# Patient Record
Sex: FEMALE | Race: ASIAN | NOT HISPANIC OR LATINO
[De-identification: names, ages, dates, MRNs, and addresses within clinical notes are randomized per-mention and may not be internally consistent; named-entity substitution may affect disease eponyms.]

---

## 2019-05-07 ENCOUNTER — APPOINTMENT (OUTPATIENT)
Dept: CARDIOLOGY | Facility: CLINIC | Age: 59
End: 2019-05-07
Payer: MEDICAID

## 2019-05-07 ENCOUNTER — NON-APPOINTMENT (OUTPATIENT)
Age: 59
End: 2019-05-07

## 2019-05-07 VITALS
RESPIRATION RATE: 17 BRPM | SYSTOLIC BLOOD PRESSURE: 168 MMHG | WEIGHT: 165 LBS | BODY MASS INDEX: 28.87 KG/M2 | OXYGEN SATURATION: 97 % | HEART RATE: 53 BPM | HEIGHT: 63.5 IN | DIASTOLIC BLOOD PRESSURE: 79 MMHG | TEMPERATURE: 98.3 F

## 2019-05-07 PROCEDURE — 93000 ELECTROCARDIOGRAM COMPLETE: CPT | Mod: 59

## 2019-05-07 PROCEDURE — 93015 CV STRESS TEST SUPVJ I&R: CPT

## 2019-05-07 PROCEDURE — 99214 OFFICE O/P EST MOD 30 MIN: CPT | Mod: 25

## 2019-05-07 PROCEDURE — 93306 TTE W/DOPPLER COMPLETE: CPT

## 2021-10-12 ENCOUNTER — NON-APPOINTMENT (OUTPATIENT)
Age: 61
End: 2021-10-12

## 2021-10-12 ENCOUNTER — APPOINTMENT (OUTPATIENT)
Dept: CARDIOLOGY | Facility: CLINIC | Age: 61
End: 2021-10-12
Payer: MEDICAID

## 2021-10-12 VITALS
TEMPERATURE: 97.2 F | BODY MASS INDEX: 29.29 KG/M2 | DIASTOLIC BLOOD PRESSURE: 77 MMHG | SYSTOLIC BLOOD PRESSURE: 171 MMHG | HEART RATE: 53 BPM | OXYGEN SATURATION: 97 % | RESPIRATION RATE: 18 BRPM | WEIGHT: 168 LBS

## 2021-10-12 DIAGNOSIS — R07.89 OTHER CHEST PAIN: ICD-10-CM

## 2021-10-12 PROCEDURE — 99072 ADDL SUPL MATRL&STAF TM PHE: CPT

## 2021-10-12 PROCEDURE — 99214 OFFICE O/P EST MOD 30 MIN: CPT | Mod: 25

## 2021-10-12 PROCEDURE — 93000 ELECTROCARDIOGRAM COMPLETE: CPT | Mod: 59

## 2021-10-12 PROCEDURE — ZZZZZ: CPT

## 2021-10-12 PROCEDURE — 93306 TTE W/DOPPLER COMPLETE: CPT

## 2021-10-12 PROCEDURE — 93015 CV STRESS TEST SUPVJ I&R: CPT

## 2021-10-12 NOTE — PHYSICAL EXAM
[General Appearance - Well Developed] : well developed [Normal Appearance] : normal appearance [Well Groomed] : well groomed [General Appearance - Well Nourished] : well nourished [No Deformities] : no deformities [General Appearance - In No Acute Distress] : no acute distress [Normal Conjunctiva] : the conjunctiva exhibited no abnormalities [Eyelids - No Xanthelasma] : the eyelids demonstrated no xanthelasmas [Normal Oral Mucosa] : normal oral mucosa [No Oral Pallor] : no oral pallor [No Oral Cyanosis] : no oral cyanosis [Normal Jugular Venous A Waves Present] : normal jugular venous A waves present [Normal Jugular Venous V Waves Present] : normal jugular venous V waves present [No Jugular Venous Dumas A Waves] : no jugular venous dumas A waves [Respiration, Rhythm And Depth] : normal respiratory rhythm and effort [Exaggerated Use Of Accessory Muscles For Inspiration] : no accessory muscle use [Auscultation Breath Sounds / Voice Sounds] : lungs were clear to auscultation bilaterally [Heart Rate And Rhythm] : heart rate and rhythm were normal [Heart Sounds] : normal S1 and S2 [Murmurs] : no murmurs present [Arterial Pulses Normal] : the arterial pulses were normal [Edema] : no peripheral edema present [Abdomen Soft] : soft [Abdomen Tenderness] : non-tender [Abdomen Mass (___ Cm)] : no abdominal mass palpated [Abnormal Walk] : normal gait [Gait - Sufficient For Exercise Testing] : the gait was sufficient for exercise testing [Nail Clubbing] : no clubbing of the fingernails [Cyanosis, Localized] : no localized cyanosis [Petechial Hemorrhages (___cm)] : no petechial hemorrhages [] : no ischemic changes [Oriented To Time, Place, And Person] : oriented to person, place, and time [Affect] : the affect was normal [Mood] : the mood was normal [No Anxiety] : not feeling anxious

## 2021-12-29 ENCOUNTER — APPOINTMENT (OUTPATIENT)
Dept: CARDIOLOGY | Facility: CLINIC | Age: 61
End: 2021-12-29
Payer: MEDICAID

## 2021-12-29 ENCOUNTER — NON-APPOINTMENT (OUTPATIENT)
Age: 61
End: 2021-12-29

## 2021-12-29 VITALS
OXYGEN SATURATION: 97 % | WEIGHT: 169 LBS | SYSTOLIC BLOOD PRESSURE: 166 MMHG | TEMPERATURE: 96 F | HEART RATE: 69 BPM | BODY MASS INDEX: 29.47 KG/M2 | RESPIRATION RATE: 18 BRPM | DIASTOLIC BLOOD PRESSURE: 91 MMHG

## 2021-12-29 VITALS — DIASTOLIC BLOOD PRESSURE: 90 MMHG | SYSTOLIC BLOOD PRESSURE: 163 MMHG

## 2021-12-29 DIAGNOSIS — K29.70 GASTRITIS, UNSPECIFIED, W/OUT BLEEDING: ICD-10-CM

## 2021-12-29 DIAGNOSIS — R07.9 CHEST PAIN, UNSPECIFIED: ICD-10-CM

## 2021-12-29 PROCEDURE — 99072 ADDL SUPL MATRL&STAF TM PHE: CPT

## 2021-12-29 PROCEDURE — 93000 ELECTROCARDIOGRAM COMPLETE: CPT | Mod: 59

## 2021-12-29 PROCEDURE — 99214 OFFICE O/P EST MOD 30 MIN: CPT

## 2021-12-29 NOTE — REASON FOR VISIT
[Symptom and Test Evaluation] : symptom and test evaluation [FreeTextEntry1] : 61-year-old female with HTN and DM presents for followup.  \par \par Patient was last seen on 5/7/19.\par \par She is on Valsartan 160 mg for HTN.  She is on a statin.  Her DM is diet-controlled.\par \par Patient underwent an echocardiogram 5/7/19 and it showed normal LV function without significant valvular pathology. \par \par Patient underwent a treadmill stress test 5/7/19  and completed 4 minutes of Dany protocol.  There were upsloping ST depressions on ECG but no symptoms.  Following treadmill stress, there was no echocardiographic evidence of ischemia. \par \par

## 2021-12-29 NOTE — DISCUSSION/SUMMARY
[Hypertension] : hypertension [Stable] : stable [Medication Changes Per Orders] : Medication changes are as documented in orders [Exercise Regimen] : an exercise regimen [Dietary Modification] : dietary modification [Weight Loss] : weight loss [Acetaminophen Avoidance] : acetaminophen  avoidance [Low Sodium Diet] : low sodium diet [NSAIDs Avoidance] : non-steroidal anti-inflammatory drugs avoidance [Patient] : the patient [de-identified] : not meet the JNC7 criteria. [de-identified] : adding  carvedilol [FreeTextEntry1] : The patient has evidence of vaccination  effect for muscle aching, gastritis and possible increase of  heart rate. She was assured and give anti acid regimen along with further control of hypertension with small beta-blocker.\par She is worried of carditis. The clinical impression could not confirm the  likelihood. will f/u. Patient understands.

## 2021-12-29 NOTE — HISTORY OF PRESENT ILLNESS
[FreeTextEntry1] : 10/12/21 - Patient reports that she had CXR done which showed calcifications. Patient has been on cholesterol medications. Patient reports L sided CP for 6 months not related to exertion, tender to touch.  Her mammogram was normal. I advised patient to undergo an echocardiogram and a treadmill stress test.  Patient underwent an echocardiogram and it showed normal LV function without significant valvular pathology. Patient underwent a treadmill stress test and completed 5 minutes of Dany protocol.  There were upsloping ST depressions on ECG but no symptoms.  Following treadmill stress, there was no echocardiographic evidence of ischemia.  Patient was reassured.  Her symptom may be musculoskeletal in etiology. \par \par Today, she is here for the manifestation of  frequent belching, general aching,  feeling of fast heart beat and swinging of BP 3 days after the  COVID booster vaccinations. She has no chest pain, no shortness of breath, no dizziness or true palpitations.\par \par

## 2021-12-29 NOTE — HISTORY OF PRESENT ILLNESS
[FreeTextEntry1] : 10/12/21 - Patient reports that she had CXR done which showed calcifications. Patient has been on cholesterol medications. Patient reports L sided CP for 6 months not related to exertion, tender to touch.  Her mammogram was normal. I advised patient to undergo an echocardiogram and a treadmill stress test.  Patient underwent an echocardiogram and it showed normal LV function without significant valvular pathology. Patient underwent a treadmill stress test and completed 5 minutes of Dany protocol.  There were upsloping ST depressions on ECG but no symptoms.  Following treadmill stress, there was no echocardiographic evidence of ischemia.  Patient was reassured.  Her symptom may be musculoskeletal in etiology. \par \par 5/7/19 - Patient reports that her BP is usually in the 50s, but now a days in the AM her HR is 70, 80s and it is very uncomfortable for her. Patient has many allergies to different medications. Her BP is high at 160. \par She reports CP, back pain. \par

## 2021-12-29 NOTE — REVIEW OF SYSTEMS
[Fever] : no fever [Headache] : no headache [Chills] : no chills [Feeling Fatigued] : not feeling fatigued [Blurry Vision] : no blurred vision [Seeing Double (Diplopia)] : no diplopia [Eye Pain] : no eye pain [Earache] : no earache [Discharge From Ears] : no discharge from the ears [Hearing Loss] : no hearing loss [Mouth Sores] : no mouth sores [Sore Throat] : no sore throat [Sinus Pressure] : no sinus pressure [Tinnitus] : no tinnitus [Vertigo] : no vertigo [SOB] : no shortness of breath [Dyspnea on exertion] : not dyspnea during exertion [Chest Discomfort] : no chest discomfort [Lower Ext Edema] : no extremity edema [Leg Claudication] : no intermittent leg claudication [Palpitations] : no palpitations [Orthopnea] : no orthopnea [PND] : no PND [Syncope] : no syncope [Cough] : no cough [Wheezing] : no wheezing [Coughing Up Blood] : no hemoptysis [Snoring] : no snoring [Abdominal Pain] : no abdominal pain [Nausea] : no nausea [Vomiting] : no vomiting [Heartburn] : no heartburn [Change in Appetite] : no change in appetite [Change In The Stool] : no change in stool [Dysphagia] : no dysphagia [Diarrhea] : diarrhea [Constipation] : no constipation [Blood in Stool] : no blood in stool [Dysuria] : no dysuria [Pelvic Pain] : no pelvic pain [Abn Vaginal Bleeding] : no unexplained vaginal bleeding [Joint Pain] : no joint pain [Joint Swelling] : no joint swelling [Joint Stiffness] : no joint stiffness [Muscle Cramps] : no muscle cramps [Myalgia] : no myalgia [Rash] : no rash [Itching] : no itching [Change In Color Of Skin] : change in skin color [Skin Lesions] : no skin lesions [Telangiectasias] : no telangiectasias [Dizziness] : no dizziness [Tremor] : no tremor was seen [Numbness (Hypoesthesia)] : no numbness [Convulsions] : no convulsions [Tingling (Paresthesia)] : no tingling [Weakness] : no weakness [Limb Weakness (Paresis)] : no limb weakness (Paresis) [Speech Disturbance] : no speech disturbance [Confusion] : no confusion was observed [Memory Lapses Or Loss] : no memory lapses or loss [Depression] : no depression [Anxiety] : no anxiety [Under Stress] : not under stress [Suicidal] : not suicidal [Easy Bleeding] : no tendency for easy bleeding [Swollen Glands] : no swollen glands [Easy Bruising] : no tendency for easy bruising [Negative] : Heme/Lymph [FreeTextEntry5] : See HPI.  [FreeTextEntry6] : See HPI.  [FreeTextEntry7] : frequent belching

## 2021-12-29 NOTE — PHYSICAL EXAM
[General Appearance - Well Developed] : well developed [Normal Appearance] : normal appearance [Well Groomed] : well groomed [General Appearance - Well Nourished] : well nourished [No Deformities] : no deformities [General Appearance - In No Acute Distress] : no acute distress [Normal Conjunctiva] : the conjunctiva exhibited no abnormalities [Eyelids - No Xanthelasma] : the eyelids demonstrated no xanthelasmas [Normal Oral Mucosa] : normal oral mucosa [No Oral Pallor] : no oral pallor [No Oral Cyanosis] : no oral cyanosis [Normal Jugular Venous A Waves Present] : normal jugular venous A waves present [Normal Jugular Venous V Waves Present] : normal jugular venous V waves present [No Jugular Venous Dumas A Waves] : no jugular venous dumas A waves [Respiration, Rhythm And Depth] : normal respiratory rhythm and effort [Exaggerated Use Of Accessory Muscles For Inspiration] : no accessory muscle use [Auscultation Breath Sounds / Voice Sounds] : lungs were clear to auscultation bilaterally [Heart Rate And Rhythm] : heart rate and rhythm were normal [Heart Sounds] : normal S1 and S2 [Murmurs] : no murmurs present [Arterial Pulses Normal] : the arterial pulses were normal [Edema] : no peripheral edema present [Abdomen Soft] : soft [Abdomen Tenderness] : non-tender [Abdomen Mass (___ Cm)] : no abdominal mass palpated [Abnormal Walk] : normal gait [Gait - Sufficient For Exercise Testing] : the gait was sufficient for exercise testing [Nail Clubbing] : no clubbing of the fingernails [Cyanosis, Localized] : no localized cyanosis [Petechial Hemorrhages (___cm)] : no petechial hemorrhages [] : no ischemic changes [Oriented To Time, Place, And Person] : oriented to person, place, and time [Affect] : the affect was normal [Mood] : the mood was normal [No Anxiety] : not feeling anxious [Normal Radial B/L] : normal radial B/L [Normal PT B/L] : normal PT B/L [Normal DP B/L] : normal DP B/L

## 2021-12-29 NOTE — REASON FOR VISIT
[Symptom and Test Evaluation] : symptom and test evaluation [FreeTextEntry1] : 61-year-old female with HTN and DM presents for followup with the manifestation of  fast heart beating, general ache, unstable BP, and belching frequently after the COVID booster shot few days ago.  \par \par Patient was last seen on 5/7/19, Oct 2021 by Dr. LUJAN for hypertension and evaluation with echocardiogram stress testing.\par \par She is on Valsartan 160 mg for HTN.  She is on a statin.  Her DM is diet-controlled.\par \par \par

## 2022-03-22 ENCOUNTER — APPOINTMENT (OUTPATIENT)
Dept: CARDIOLOGY | Facility: CLINIC | Age: 62
End: 2022-03-22
Payer: MEDICAID

## 2022-03-22 VITALS
BODY MASS INDEX: 29.12 KG/M2 | RESPIRATION RATE: 18 BRPM | DIASTOLIC BLOOD PRESSURE: 90 MMHG | TEMPERATURE: 97.8 F | HEART RATE: 67 BPM | WEIGHT: 167 LBS | OXYGEN SATURATION: 97 % | SYSTOLIC BLOOD PRESSURE: 157 MMHG

## 2022-03-22 PROCEDURE — 99214 OFFICE O/P EST MOD 30 MIN: CPT

## 2022-03-22 PROCEDURE — 99072 ADDL SUPL MATRL&STAF TM PHE: CPT

## 2022-03-22 NOTE — PHYSICAL EXAM
[General Appearance - Well Developed] : well developed [Normal Appearance] : normal appearance [Well Groomed] : well groomed [General Appearance - Well Nourished] : well nourished [No Deformities] : no deformities [General Appearance - In No Acute Distress] : no acute distress [Normal Conjunctiva] : the conjunctiva exhibited no abnormalities [Eyelids - No Xanthelasma] : the eyelids demonstrated no xanthelasmas [Normal Oral Mucosa] : normal oral mucosa [No Oral Pallor] : no oral pallor [No Oral Cyanosis] : no oral cyanosis [Normal Jugular Venous A Waves Present] : normal jugular venous A waves present [Normal Jugular Venous V Waves Present] : normal jugular venous V waves present [No Jugular Venous Dumas A Waves] : no jugular venous dumas A waves [Respiration, Rhythm And Depth] : normal respiratory rhythm and effort [Auscultation Breath Sounds / Voice Sounds] : lungs were clear to auscultation bilaterally [Exaggerated Use Of Accessory Muscles For Inspiration] : no accessory muscle use [Heart Rate And Rhythm] : heart rate and rhythm were normal [Heart Sounds] : normal S1 and S2 [Murmurs] : no murmurs present [Arterial Pulses Normal] : the arterial pulses were normal [Edema] : no peripheral edema present [Abdomen Soft] : soft [Abdomen Tenderness] : non-tender [Abdomen Mass (___ Cm)] : no abdominal mass palpated [Abnormal Walk] : normal gait [Gait - Sufficient For Exercise Testing] : the gait was sufficient for exercise testing [Nail Clubbing] : no clubbing of the fingernails [Cyanosis, Localized] : no localized cyanosis [Petechial Hemorrhages (___cm)] : no petechial hemorrhages [] : no ischemic changes [Oriented To Time, Place, And Person] : oriented to person, place, and time [Affect] : the affect was normal [Mood] : the mood was normal [No Anxiety] : not feeling anxious

## 2022-03-23 NOTE — CARDIOLOGY SUMMARY
[___] : [unfilled] [de-identified] : Patient underwent an echocardiogram 5/7/19 and it showed normal LV function without significant valvular pathology.  [de-identified] : \par Patient underwent a treadmill stress test 5/7/19  and completed 4 minutes of Dany protocol.  There were upsloping ST depressions on ECG but no symptoms.  Following treadmill stress, there was no echocardiographic evidence of ischemia. \par

## 2022-03-23 NOTE — HISTORY OF PRESENT ILLNESS
[FreeTextEntry1] : 3/22/22 - Patient had check up with PCP last week and was found to have bradycardia HR in the 40s. Patient felt sick after getting third dose of vaccine with epigastric discomfort better after one week on Omeprazole. Patient is on Valsartan 80 mg BID and reports that her BP at home is 120/80. Patient denies CP, SOB, palpitations, or lightheadedness. I advised patient to wear a Holter monitor. I advised patient to exercise more. \par \par \par \par 10/12/21 - Patient reports that she had CXR done which showed calcifications. Patient has been on cholesterol medications. Patient reports L sided CP for 6 months not related to exertion, tender to touch.  Her mammogram was normal. I advised patient to undergo an echocardiogram and a treadmill stress test.  Patient underwent an echocardiogram and it showed normal LV function without significant valvular pathology. Patient underwent a treadmill stress test and completed 5 minutes of Dany protocol.  There were upsloping ST depressions on ECG but no symptoms.  Following treadmill stress, there was no echocardiographic evidence of ischemia.  Patient was reassured.  Her symptom may be musculoskeletal in etiology. \par \par 5/7/19 - Patient reports that her BP is usually in the 50s, but now a days in the AM her HR is 70, 80s and it is very uncomfortable for her. Patient has many allergies to different medications. Her BP is high at 160. \par She reports CP, back pain. \par

## 2022-03-23 NOTE — REASON FOR VISIT
[FreeTextEntry1] : 61-year-old female with HTN and DM presents for followup.  \par \par Patient was last seen on 10/12/21 for L sided CP for 6 months not related to exertion.  I advised patient to undergo an echocardiogram and a treadmill stress test.  Patient underwent an echocardiogram and it showed normal LV function without significant valvular pathology. Patient underwent a treadmill stress test and completed 5 minutes of Dany protocol.  There were upsloping ST depressions on ECG but no symptoms.  Following treadmill stress, there was no echocardiographic evidence of ischemia.  Her symptom was felt to be musculoskeletal in etiology. \par \par She is on Valsartan 160 mg for HTN.  She is on a statin.  Her DM is diet-controlled.

## 2022-03-25 ENCOUNTER — NON-APPOINTMENT (OUTPATIENT)
Age: 62
End: 2022-03-25

## 2022-04-07 ENCOUNTER — NON-APPOINTMENT (OUTPATIENT)
Age: 62
End: 2022-04-07

## 2022-04-07 RX ORDER — CARVEDILOL 3.12 MG/1
3.12 TABLET, FILM COATED ORAL TWICE DAILY
Qty: 60 | Refills: 2 | Status: DISCONTINUED | COMMUNITY
Start: 2021-12-29 | End: 2022-04-07

## 2022-11-09 ENCOUNTER — APPOINTMENT (OUTPATIENT)
Dept: CARDIOLOGY | Facility: CLINIC | Age: 62
End: 2022-11-09

## 2022-11-09 VITALS
DIASTOLIC BLOOD PRESSURE: 95 MMHG | HEART RATE: 54 BPM | OXYGEN SATURATION: 98 % | WEIGHT: 174 LBS | RESPIRATION RATE: 18 BRPM | TEMPERATURE: 97.5 F | BODY MASS INDEX: 30.34 KG/M2 | SYSTOLIC BLOOD PRESSURE: 194 MMHG

## 2022-11-09 DIAGNOSIS — R06.02 SHORTNESS OF BREATH: ICD-10-CM

## 2022-11-09 PROCEDURE — 93306 TTE W/DOPPLER COMPLETE: CPT

## 2022-11-09 PROCEDURE — 93015 CV STRESS TEST SUPVJ I&R: CPT

## 2022-11-09 PROCEDURE — 99214 OFFICE O/P EST MOD 30 MIN: CPT | Mod: 25

## 2022-11-09 PROCEDURE — 93000 ELECTROCARDIOGRAM COMPLETE: CPT | Mod: 59

## 2022-11-10 NOTE — HISTORY OF PRESENT ILLNESS
[FreeTextEntry1] : 11/9/22- Patient contracted COVID 20 days ago but she has been feeling palpitations with fast HR of 70-80 in the mornings. Patient reports that she had one day of fever and she took Paxlovid and had allergic reaction to it and her BP went up to 200 and went to ER. Patient also reports fast HR with exertion. Her HR is normally slow. Patient also reports back pain. I advised patient to undergo an echocardiogram and a treadmill stress test. I advised patient to wear a Holter monitor. \par \par 3/22/22 - Patient had check up with PCP last week and was found to have bradycardia HR in the 40s. Patient felt sick after getting third dose of vaccine with epigastric discomfort better after one week on Omeprazole. Patient is on Valsartan 80 mg BID and reports that her BP at home is 120/80. Patient denies CP, SOB, palpitations, or lightheadedness. I advised patient to wear a Holter monitor. I advised patient to exercise more.  Patient wore a Holter and it showed average HR 49, 1 PVC, rare PACs, couplets and 49 episodes of PAT, the longest 8 beats at a rate of 122, the fastest 3 beats at a rate of 129. No pause. No treatment needed for now. \par \par 10/12/21 - Patient reports that she had CXR done which showed calcifications. Patient has been on cholesterol medications. Patient reports L sided CP for 6 months not related to exertion, tender to touch.  Her mammogram was normal. I advised patient to undergo an echocardiogram and a treadmill stress test.  Patient underwent an echocardiogram and it showed normal LV function without significant valvular pathology. Patient underwent a treadmill stress test and completed 5 minutes of Dany protocol.  There were upsloping ST depressions on ECG but no symptoms.  Following treadmill stress, there was no echocardiographic evidence of ischemia.  Patient was reassured.  Her symptom may be musculoskeletal in etiology. \par \par 5/7/19 - Patient reports that her BP is usually in the 50s, but now a days in the AM her HR is 70, 80s and it is very uncomfortable for her. Patient has many allergies to different medications. Her BP is high at 160. \par She reports CP, back pain. \par

## 2022-11-10 NOTE — CARDIOLOGY SUMMARY
[___] : [unfilled] [de-identified] : \par Patient underwent a treadmill stress test 5/7/19  and completed 4 minutes of Dany protocol.  There were upsloping ST depressions on ECG but no symptoms.  Following treadmill stress, there was no echocardiographic evidence of ischemia. \par  [de-identified] : Patient underwent an echocardiogram 5/7/19 and it showed normal LV function without significant valvular pathology.

## 2022-11-10 NOTE — REASON FOR VISIT
[FreeTextEntry1] : 61-year-old female with HTN and DM presents for followup.  \par \par Patient was last seen on 3/22/22 - Patient had check up with PCP last week and was found to have bradycardia HR in the 40s. Patient felt sick after getting third dose of vaccine with epigastric discomfort better after one week on Omeprazole. Patient is on Valsartan 80 mg BID and reports that her BP at home is 120/80. Patient denies CP, SOB, palpitations, or lightheadedness. I advised patient to wear a Holter monitor. I advised patient to exercise more.  Patient wore a Holter and it showed average HR 49, 1 PVC, rare PACs, couplets and 49 episodes of PAT, the longest 8 beats at a rate of 122, the fastest 3 beats at a rate of 129. No pause. No treatment needed for now. \par \par She is on Valsartan 80 mg BID for HTN.  She is on a statin.  Her DM is diet-controlled.\par \par Patient underwent an echocardiogram 10/12/21 and it showed normal LV function without significant valvular pathology. \par \par Patient underwent a treadmill stress test 10/12/21 and completed 5 minutes of Dany protocol.  There were upsloping ST depressions on ECG but no symptoms.  Following treadmill stress, there was no echocardiographic evidence of ischemia.  Her symptom was felt to be musculoskeletal in etiology. \par \par

## 2022-11-14 ENCOUNTER — NON-APPOINTMENT (OUTPATIENT)
Age: 62
End: 2022-11-14

## 2022-11-17 ENCOUNTER — APPOINTMENT (OUTPATIENT)
Dept: CARDIOLOGY | Facility: CLINIC | Age: 62
End: 2022-11-17

## 2022-11-17 VITALS
OXYGEN SATURATION: 97 % | DIASTOLIC BLOOD PRESSURE: 84 MMHG | HEART RATE: 65 BPM | BODY MASS INDEX: 30.34 KG/M2 | WEIGHT: 174 LBS | TEMPERATURE: 97.7 F | SYSTOLIC BLOOD PRESSURE: 195 MMHG | RESPIRATION RATE: 18 BRPM

## 2022-11-17 PROCEDURE — 99213 OFFICE O/P EST LOW 20 MIN: CPT

## 2022-11-17 NOTE — CARDIOLOGY SUMMARY
[___] : [unfilled] [de-identified] : \par Patient underwent a treadmill stress test 5/7/19  and completed 4 minutes of Dany protocol.  There were upsloping ST depressions on ECG but no symptoms.  Following treadmill stress, there was no echocardiographic evidence of ischemia. \par  [de-identified] : Patient underwent an echocardiogram 5/7/19 and it showed normal LV function without significant valvular pathology.

## 2022-11-17 NOTE — REASON FOR VISIT
[FreeTextEntry1] : 61-year-old female with HTN and DM presents for followup.  \par \par Patient was last seen on 11/9/22 for palpitations after viola Covid.  I advised patient to undergo an echocardiogram and a treadmill stress test. I advised patient to wear a Holter monitor.  Patient underwent an echocardiogram and it showed normal LV function without significant valvular pathology. Patient underwent a treadmill stress test and completed 4.5 minutes of Dany protocol.  There were upsloping ST depressions on ECG but no symptoms.  Following treadmill stress, there was no echocardiographic evidence of ischemia. \par \par She is on Valsartan 80 mg BID for HTN.  She is on a statin.  Her DM is diet-controlled.\par \par Patient wore a Holter 3/22/22and it showed average HR 49, 1 PVC, rare PACs, couplets and 49 episodes of PAT, the longest 8 beats at a rate of 122, the fastest 3 beats at a rate of 129. No pause. No treatment needed for now. \par \par Patient underwent an echocardiogram 10/12/21 and it showed normal LV function without significant valvular pathology. \par \par Patient underwent a treadmill stress test 10/12/21 and completed 5 minutes of Dany protocol.  There were upsloping ST depressions on ECG but no symptoms.  Following treadmill stress, there was no echocardiographic evidence of ischemia.  Her symptom was felt to be musculoskeletal in etiology. \par \par

## 2022-11-17 NOTE — HISTORY OF PRESENT ILLNESS
[FreeTextEntry1] : 11/17/22 - Pt is here for high blood pressure. Pt is on Amlodipine 5 mg. She stopped taking Valsartan 160 mg due to her diastolic BP 50s. Her BP at home up to 160-170s/-. She was nervous due to elevated BP. She went to see Psych, and started Trazodone, Clonazepam, and Lexapro. \par \par 11/9/22- Patient contracted COVID 20 days ago but she has been feeling palpitations with fast HR of 70-80 in the mornings. Patient reports that she had one day of fever and she took Paxlovid and had allergic reaction to it and her BP went up to 200 and went to ER. Patient also reports fast HR with exertion. Her HR is normally slow. Patient also reports back pain. I advised patient to undergo an echocardiogram and a treadmill stress test. I advised patient to wear a Holter monitor. \par \par 3/22/22 - Patient had check up with PCP last week and was found to have bradycardia HR in the 40s. Patient felt sick after getting third dose of vaccine with epigastric discomfort better after one week on Omeprazole. Patient is on Valsartan 80 mg BID and reports that her BP at home is 120/80. Patient denies CP, SOB, palpitations, or lightheadedness. I advised patient to wear a Holter monitor. I advised patient to exercise more.  Patient wore a Holter and it showed average HR 49, 1 PVC, rare PACs, couplets and 49 episodes of PAT, the longest 8 beats at a rate of 122, the fastest 3 beats at a rate of 129. No pause. No treatment needed for now. \par \par 10/12/21 - Patient reports that she had CXR done which showed calcifications. Patient has been on cholesterol medications. Patient reports L sided CP for 6 months not related to exertion, tender to touch.  Her mammogram was normal. I advised patient to undergo an echocardiogram and a treadmill stress test.  Patient underwent an echocardiogram and it showed normal LV function without significant valvular pathology. Patient underwent a treadmill stress test and completed 5 minutes of Dany protocol.  There were upsloping ST depressions on ECG but no symptoms.  Following treadmill stress, there was no echocardiographic evidence of ischemia.  Patient was reassured.  Her symptom may be musculoskeletal in etiology. \par \par 5/7/19 - Patient reports that her BP is usually in the 50s, but now a days in the AM her HR is 70, 80s and it is very uncomfortable for her. Patient has many allergies to different medications. Her BP is high at 160. \par She reports CP, back pain. \par

## 2022-12-05 ENCOUNTER — NON-APPOINTMENT (OUTPATIENT)
Age: 62
End: 2022-12-05

## 2022-12-08 ENCOUNTER — APPOINTMENT (OUTPATIENT)
Dept: CARDIOLOGY | Facility: CLINIC | Age: 62
End: 2022-12-08

## 2022-12-08 VITALS
HEART RATE: 58 BPM | WEIGHT: 176 LBS | SYSTOLIC BLOOD PRESSURE: 181 MMHG | BODY MASS INDEX: 30.69 KG/M2 | OXYGEN SATURATION: 97 % | TEMPERATURE: 97.7 F | RESPIRATION RATE: 18 BRPM | DIASTOLIC BLOOD PRESSURE: 84 MMHG

## 2022-12-08 PROCEDURE — 99213 OFFICE O/P EST LOW 20 MIN: CPT

## 2022-12-08 NOTE — CARDIOLOGY SUMMARY
[___] : [unfilled] [de-identified] : \par Patient underwent a treadmill stress test 5/7/19  and completed 4 minutes of Dany protocol.  There were upsloping ST depressions on ECG but no symptoms.  Following treadmill stress, there was no echocardiographic evidence of ischemia. \par  [de-identified] : Patient underwent an echocardiogram 5/7/19 and it showed normal LV function without significant valvular pathology.

## 2022-12-08 NOTE — REASON FOR VISIT
[FreeTextEntry1] : 62-year-old female with HTN, DM, presents for followup.  \par \becca Patient was last seen on 11/17/22 for high blood pressure. Pt was on Amlodipine 5 mg. She stopped taking Valsartan 160 mg due to her diastolic BP 50s. Her BP at home up to 160-170s/-. She was nervous due to elevated BP. She went to see Psych, and started Trazodone, Clonazepam, and Lexapro. \par \par She is on Amlodipine 5 mg for HTN.  She is on a statin.  Her DM is diet-controlled.\par \par Patient wore a Holter 3/22/22 and it showed average HR 49, 1 PVC, rare PACs, couplets and 49 episodes of PAT, the longest 8 beats at a rate of 122, the fastest 3 beats at a rate of 129. No pause. No treatment needed for now. \par \becca Patient underwent an echocardiogram 10/12/21 and it showed normal LV function without significant valvular pathology. \par \becca Patient underwent a treadmill stress test 10/12/21 and completed 5 minutes of Dany protocol.  There were upsloping ST depressions on ECG but no symptoms.  Following treadmill stress, there was no echocardiographic evidence of ischemia.  Her symptom was felt to be musculoskeletal in etiology. \par \par

## 2022-12-08 NOTE — HISTORY OF PRESENT ILLNESS
[FreeTextEntry1] : 12/8/22 - Pt has stopped Amlodipine 5 mg for 2 days due to recurred Amlodipine allergy (swelling gum). She resumed Valsartan 160 mg. Her BP was well controlled when she was on Amlodipine (120s/-). She reports BP this morning was a little elevated (140s/-). Pt is on Simvastatin 20 mg. She held it for 10 days in October due to use of Paxlovid. Her LDL was 249 on 11/14. She would like to repeat lipid panel.\par \par 11/17/22 - Pt is here for high blood pressure. Pt is on Amlodipine 5 mg. She stopped taking Valsartan 160 mg due to her diastolic BP 50s. Her BP at home up to 160-170s/-. She was nervous due to elevated BP. She went to see Psych, and started Trazodone, Clonazepam, and Lexapro. \par \par 11/9/22- Patient contracted COVID 20 days ago but she has been feeling palpitations with fast HR of 70-80 in the mornings. Patient reports that she had one day of fever and she took Paxlovid and had allergic reaction to it and her BP went up to 200 and went to ER. Patient also reports fast HR with exertion. Her HR is normally slow. Patient also reports back pain. I advised patient to undergo an echocardiogram and a treadmill stress test. I advised patient to wear a Holter monitor. \par \par 3/22/22 - Patient had check up with PCP last week and was found to have bradycardia HR in the 40s. Patient felt sick after getting third dose of vaccine with epigastric discomfort better after one week on Omeprazole. Patient is on Valsartan 80 mg BID and reports that her BP at home is 120/80. Patient denies CP, SOB, palpitations, or lightheadedness. I advised patient to wear a Holter monitor. I advised patient to exercise more.  Patient wore a Holter and it showed average HR 49, 1 PVC, rare PACs, couplets and 49 episodes of PAT, the longest 8 beats at a rate of 122, the fastest 3 beats at a rate of 129. No pause. No treatment needed for now. \par \par 10/12/21 - Patient reports that she had CXR done which showed calcifications. Patient has been on cholesterol medications. Patient reports L sided CP for 6 months not related to exertion, tender to touch.  Her mammogram was normal. I advised patient to undergo an echocardiogram and a treadmill stress test.  Patient underwent an echocardiogram and it showed normal LV function without significant valvular pathology. Patient underwent a treadmill stress test and completed 5 minutes of Dany protocol.  There were upsloping ST depressions on ECG but no symptoms.  Following treadmill stress, there was no echocardiographic evidence of ischemia.  Patient was reassured.  Her symptom may be musculoskeletal in etiology. \par \par 5/7/19 - Patient reports that her BP is usually in the 50s, but now a days in the AM her HR is 70, 80s and it is very uncomfortable for her. Patient has many allergies to different medications. Her BP is high at 160. \par She reports CP, back pain. \par

## 2022-12-09 LAB
CHOLEST SERPL-MCNC: 250 MG/DL
ESTIMATED AVERAGE GLUCOSE: 157 MG/DL
HBA1C MFR BLD HPLC: 7.1 %
HDLC SERPL-MCNC: 62 MG/DL
LDLC SERPL CALC-MCNC: 162 MG/DL
NONHDLC SERPL-MCNC: 188 MG/DL
TRIGL SERPL-MCNC: 129 MG/DL

## 2023-02-09 ENCOUNTER — NON-APPOINTMENT (OUTPATIENT)
Age: 63
End: 2023-02-09

## 2023-02-09 ENCOUNTER — APPOINTMENT (OUTPATIENT)
Dept: CARDIOLOGY | Facility: CLINIC | Age: 63
End: 2023-02-09
Payer: MEDICAID

## 2023-02-09 VITALS
OXYGEN SATURATION: 97 % | SYSTOLIC BLOOD PRESSURE: 154 MMHG | TEMPERATURE: 97.1 F | RESPIRATION RATE: 18 BRPM | BODY MASS INDEX: 30.69 KG/M2 | HEART RATE: 78 BPM | DIASTOLIC BLOOD PRESSURE: 80 MMHG | WEIGHT: 176 LBS

## 2023-02-09 PROCEDURE — 93000 ELECTROCARDIOGRAM COMPLETE: CPT

## 2023-02-09 PROCEDURE — 99213 OFFICE O/P EST LOW 20 MIN: CPT | Mod: 25

## 2023-02-22 NOTE — REASON FOR VISIT
[FreeTextEntry1] : 62-year-old female with HTN, DM, presents for followup.  \par \par Patient was last seen on 12/8/22 for followup.  Pt stopped Amlodipine 5 mg for 2 days due to recurred Amlodipine allergy (swelling gum). She resumed Valsartan 160 mg. Her BP was well controlled when she was on Amlodipine (120s/-). She reports BP this morning was a little elevated (140s/-). Pt is on Simvastatin 20 mg. She held it for 10 days in October due to use of Paxlovid. Her LDL was 249 on 11/14. She would like to repeat lipid panel.\par \par She is on Valsartan 160 mg BID and HCTZ 12.5 mg for HTN.  She is OFF Amlodipine 5 mg for HTN.  She is on a statin.  Her DM is diet-controlled.\par \par Patient wore a Holter 3/22/22 and it showed average HR 49, 1 PVC, rare PACs, couplets and 49 episodes of PAT, the longest 8 beats at a rate of 122, the fastest 3 beats at a rate of 129. No pause. No treatment needed for now. \par \par Patient underwent an echocardiogram 10/12/21 and it showed normal LV function without significant valvular pathology. \par \par Patient underwent a treadmill stress test 10/12/21 and completed 5 minutes of Dany protocol.  There were upsloping ST depressions on ECG but no symptoms.  Following treadmill stress, there was no echocardiographic evidence of ischemia.  Her symptom was felt to be musculoskeletal in etiology. \par \par

## 2023-02-22 NOTE — HISTORY OF PRESENT ILLNESS
[FreeTextEntry1] : 2/9/23 - Patient still has cold symptoms with cough. She reports palpitations with HR of 100s at night for the last 2 weeks. Patient reports elevated BP of 180/190 and HR of 100 this morning when she was having a bm. I advised patient to start on Propranolol 10 mg BID as needed for palpitations. I advised patient to start on Amlodipine 5 mg as needed for elevated BP. \par \par 12/8/22 - Pt has stopped Amlodipine 5 mg for 2 days due to recurred Amlodipine allergy (swelling gum). She resumed Valsartan 160 mg. Her BP was well controlled when she was on Amlodipine (120s/-). She reports BP this morning was a little elevated (140s/-). Pt is on Simvastatin 20 mg. She held it for 10 days in October due to use of Paxlovid. Her LDL was 249 on 11/14. She would like to repeat lipid panel.\par \par 11/17/22 - Pt is here for high blood pressure. Pt is on Amlodipine 5 mg. She stopped taking Valsartan 160 mg due to her diastolic BP 50s. Her BP at home up to 160-170s/-. She was nervous due to elevated BP. She went to see Psych, and started Trazodone, Clonazepam, and Lexapro. \par \par 11/9/22- Patient contracted COVID 20 days ago but she has been feeling palpitations with fast HR of 70-80 in the mornings. Patient reports that she had one day of fever and she took Paxlovid and had allergic reaction to it and her BP went up to 200 and went to ER. Patient also reports fast HR with exertion. Her HR is normally slow. Patient also reports back pain. I advised patient to undergo an echocardiogram and a treadmill stress test. I advised patient to wear a Holter monitor. \par \par 3/22/22 - Patient had check up with PCP last week and was found to have bradycardia HR in the 40s. Patient felt sick after getting third dose of vaccine with epigastric discomfort better after one week on Omeprazole. Patient is on Valsartan 80 mg BID and reports that her BP at home is 120/80. Patient denies CP, SOB, palpitations, or lightheadedness. I advised patient to wear a Holter monitor. I advised patient to exercise more.  Patient wore a Holter and it showed average HR 49, 1 PVC, rare PACs, couplets and 49 episodes of PAT, the longest 8 beats at a rate of 122, the fastest 3 beats at a rate of 129. No pause. No treatment needed for now. \par \par 10/12/21 - Patient reports that she had CXR done which showed calcifications. Patient has been on cholesterol medications. Patient reports L sided CP for 6 months not related to exertion, tender to touch.  Her mammogram was normal. I advised patient to undergo an echocardiogram and a treadmill stress test.  Patient underwent an echocardiogram and it showed normal LV function without significant valvular pathology. Patient underwent a treadmill stress test and completed 5 minutes of Dany protocol.  There were upsloping ST depressions on ECG but no symptoms.  Following treadmill stress, there was no echocardiographic evidence of ischemia.  Patient was reassured.  Her symptom may be musculoskeletal in etiology. \par \par 5/7/19 - Patient reports that her BP is usually in the 50s, but now a days in the AM her HR is 70, 80s and it is very uncomfortable for her. Patient has many allergies to different medications. Her BP is high at 160. \par She reports CP, back pain. \par

## 2023-02-22 NOTE — CARDIOLOGY SUMMARY
[___] : [unfilled] [de-identified] : \par Patient underwent a treadmill stress test 5/7/19  and completed 4 minutes of Dany protocol.  There were upsloping ST depressions on ECG but no symptoms.  Following treadmill stress, there was no echocardiographic evidence of ischemia. \par  [de-identified] : Patient underwent an echocardiogram 5/7/19 and it showed normal LV function without significant valvular pathology.

## 2023-02-23 ENCOUNTER — FORM ENCOUNTER (OUTPATIENT)
Age: 63
End: 2023-02-23

## 2023-02-27 ENCOUNTER — NON-APPOINTMENT (OUTPATIENT)
Age: 63
End: 2023-02-27

## 2024-02-14 ENCOUNTER — NON-APPOINTMENT (OUTPATIENT)
Age: 64
End: 2024-02-14

## 2024-02-14 ENCOUNTER — APPOINTMENT (OUTPATIENT)
Dept: CARDIOLOGY | Facility: CLINIC | Age: 64
End: 2024-02-14
Payer: MEDICAID

## 2024-02-14 VITALS
TEMPERATURE: 97.6 F | HEART RATE: 58 BPM | DIASTOLIC BLOOD PRESSURE: 75 MMHG | SYSTOLIC BLOOD PRESSURE: 142 MMHG | OXYGEN SATURATION: 96 % | BODY MASS INDEX: 30.69 KG/M2 | RESPIRATION RATE: 18 BRPM | WEIGHT: 176 LBS

## 2024-02-14 DIAGNOSIS — R00.2 PALPITATIONS: ICD-10-CM

## 2024-02-14 DIAGNOSIS — I47.19 OTHER SUPRAVENTRICULAR TACHYCARDIA: ICD-10-CM

## 2024-02-14 DIAGNOSIS — E78.5 HYPERLIPIDEMIA, UNSPECIFIED: ICD-10-CM

## 2024-02-14 PROCEDURE — 93000 ELECTROCARDIOGRAM COMPLETE: CPT

## 2024-02-14 PROCEDURE — 99214 OFFICE O/P EST MOD 30 MIN: CPT | Mod: 25

## 2024-02-14 RX ORDER — SIMVASTATIN 40 MG/1
40 TABLET, FILM COATED ORAL
Qty: 90 | Refills: 1 | Status: ACTIVE | COMMUNITY
Start: 2024-02-14 | End: 1900-01-01

## 2024-02-14 NOTE — CARDIOLOGY SUMMARY
[___] : [unfilled] [de-identified] : \par  Patient underwent a treadmill stress test 5/7/19  and completed 4 minutes of Dany protocol.  There were upsloping ST depressions on ECG but no symptoms.  Following treadmill stress, there was no echocardiographic evidence of ischemia. \par   [de-identified] : Patient underwent an echocardiogram 5/7/19 and it showed normal LV function without significant valvular pathology.

## 2024-02-14 NOTE — HISTORY OF PRESENT ILLNESS
[FreeTextEntry1] : 2/14/24 - Patient was advised to return for followup because of elevated LDL of 202.  Patient reports strong family history of CAD.  She was on Simvastatin 20 mg which was not effective.  She was switched to Rosuvastatin 10 mg which gave her myalgia.  I advised patient to try Nexletol.  If not effective, she will then need PCSK9i.  But pt feels like Simvastatin 20 mg was working until she was switched to a generic.  I offered her to try Zocor 40 mg first.  If not effective, then Nexletol, and then PCSK9i if needed.  Patient reports SSCP associated with elevated BP.  I advised patient to consider CTA of the coronary arteries given her strong family history.  Pt will consider.  (1) Elevated LDL - I advised patient to try Nexletol.  If not effective, she will then need PCSK9i.  But pt feels like Simvastatin 20 mg was working until she was switched to a generic.  I offered her to try Zocor 40 mg first.  If not effective, then Nexletol, and then PCSK9i if needed.  Patient requests repeat lipid profile.  (2) CP -  I advised patient to consider CTA of the coronary arteries given her strong family history.  Pt will consider.  (3) HTN - Her BP was borderline in office 142/75 but normal at home.  I advised patient to continue Valsartan 160 mg QD and Amlodipine 5 mg.  (4) Followup - pending CTA of the coronary arteries.    2/9/23 - Patient still has cold symptoms with cough. She reports palpitations with HR of 100s at night for the last 2 weeks. Patient reports elevated BP of 180/190 and HR of 100 this morning when she was having a bm. I advised patient to start on Propranolol 10 mg BID as needed for palpitations. I advised patient to start on Amlodipine 5 mg as needed for elevated BP.  Patient wore a 7-day monitor and it showed average HR 61, 1 PVC, rare PACs, couplets and 160s PATs, the fastest at a rate of 132, the longest 9 beats. No A. Fib.  12/8/22 - Pt has stopped Amlodipine 5 mg for 2 days due to recurred Amlodipine allergy (swelling gum). She resumed Valsartan 160 mg. Her BP was well controlled when she was on Amlodipine (120s/-). She reports BP this morning was a little elevated (140s/-). Pt is on Simvastatin 20 mg. She held it for 10 days in October due to use of Paxlovid. Her LDL was 249 on 11/14. She would like to repeat lipid panel.  11/17/22 - Pt is here for high blood pressure. Pt is on Amlodipine 5 mg. She stopped taking Valsartan 160 mg due to her diastolic BP 50s. Her BP at home up to 160-170s/-. She was nervous due to elevated BP. She went to see Psych, and started Trazodone, Clonazepam, and Lexapro.  Patient underwent an echocardiogram and it showed normal LV function, moderate LVH, without significant valvular pathology. Patient underwent a treadmill stress test and completed 4.5 minutes of Dany protocol.  There were upsloping ST depressions on ECG but no symptoms.  Following treadmill stress, there was no echocardiographic evidence of ischemia.  Hypertensive response noted.   11/9/22- Patient contracted COVID 20 days ago but she has been feeling palpitations with fast HR of 70-80 in the mornings. Patient reports that she had one day of fever and she took Paxlovid and had allergic reaction to it and her BP went up to 200 and went to ER. Patient also reports fast HR with exertion. Her HR is normally slow. Patient also reports back pain. I advised patient to undergo an echocardiogram and a treadmill stress test. I advised patient to wear a Holter monitor.     3/22/22 - Patient had check up with PCP last week and was found to have bradycardia HR in the 40s. Patient felt sick after getting third dose of vaccine with epigastric discomfort better after one week on Omeprazole. Patient is on Valsartan 80 mg BID and reports that her BP at home is 120/80. Patient denies CP, SOB, palpitations, or lightheadedness. I advised patient to wear a Holter monitor. I advised patient to exercise more.  Patient wore a Holter and it showed average HR 49, 1 PVC, rare PACs, couplets and 49 episodes of PAT, the longest 8 beats at a rate of 122, the fastest 3 beats at a rate of 129. No pause. No treatment needed for now.   10/12/21 - Patient reports that she had CXR done which showed calcifications. Patient has been on cholesterol medications. Patient reports L sided CP for 6 months not related to exertion, tender to touch.  Her mammogram was normal. I advised patient to undergo an echocardiogram and a treadmill stress test.  Patient underwent an echocardiogram and it showed normal LV function without significant valvular pathology. Patient underwent a treadmill stress test and completed 5 minutes of Dany protocol.  There were upsloping ST depressions on ECG but no symptoms.  Following treadmill stress, there was no echocardiographic evidence of ischemia.  Patient was reassured.  Her symptom may be musculoskeletal in etiology.   5/7/19 - Patient reports that her BP is usually in the 50s, but now a days in the AM her HR is 70, 80s and it is very uncomfortable for her. Patient has many allergies to different medications. Her BP is high at 160.  She reports CP, back pain.

## 2024-02-14 NOTE — REASON FOR VISIT
[FreeTextEntry1] : 64 year-old female with PAT,  HTN, DM (6.9), HLD (), presents for followup.    Patient was last seen on 2/9/23 - Patient still has cold symptoms with cough. She reports palpitations with HR of 100s at night for the last 2 weeks. Patient reports elevated BP of 180/190 and HR of 100 this morning when she was having a bm. I advised patient to start on Propranolol 10 mg BID as needed for palpitations. I advised patient to start on Amlodipine 5 mg as needed for elevated BP.  Patient wore a 7-day monitor and it showed average HR 61, 1 PVC, rare PACs, couplets and 160s PATs, the fastest at a rate of 132, the longest 9 beats. No A. Fib.  She is on Valsartan 160 mg QD and Amlodipine 5 mg for HTN.  She is OFF Rosuvastatin.  She is on Jardiance 10 mg for DM.  She is OFF HCTZ 12.5 mg for HTN.    Patient underwent an echocardiogram 11/17/22 and it showed normal LV function, moderate LVH, without significant valvular pathology.   Patient underwent a treadmill stress test 11/17/22 and completed 4.5 minutes of Dany protocol.  There were upsloping ST depressions on ECG but no symptoms.  Following treadmill stress, there was no echocardiographic evidence of ischemia.  Hypertensive response noted.   Patient wore a Holter 3/22/22 and it showed average HR 49, 1 PVC, rare PACs, couplets and 49 episodes of PAT, the longest 8 beats at a rate of 122, the fastest 3 beats at a rate of 129. No pause. No treatment needed for now.   Patient underwent an echocardiogram 10/12/21 and it showed normal LV function without significant valvular pathology.   Patient underwent a treadmill stress test 10/12/21 and completed 5 minutes of Dany protocol.  There were upsloping ST depressions on ECG but no symptoms.  Following treadmill stress, there was no echocardiographic evidence of ischemia.  Her symptom was felt to be musculoskeletal in etiology.

## 2024-02-15 LAB
ALBUMIN SERPL ELPH-MCNC: 4.3 G/DL
ALP BLD-CCNC: 69 U/L
ALT SERPL-CCNC: 14 U/L
ANION GAP SERPL CALC-SCNC: 12 MMOL/L
AST SERPL-CCNC: 13 U/L
BILIRUB SERPL-MCNC: 0.5 MG/DL
BUN SERPL-MCNC: 21 MG/DL
CALCIUM SERPL-MCNC: 9.1 MG/DL
CHLORIDE SERPL-SCNC: 102 MMOL/L
CHOLEST SERPL-MCNC: 273 MG/DL
CO2 SERPL-SCNC: 26 MMOL/L
CREAT SERPL-MCNC: 0.72 MG/DL
EGFR: 93 ML/MIN/1.73M2
GLUCOSE SERPL-MCNC: 229 MG/DL
HDLC SERPL-MCNC: 63 MG/DL
LDLC SERPL CALC-MCNC: 178 MG/DL
NONHDLC SERPL-MCNC: 210 MG/DL
POTASSIUM SERPL-SCNC: 4.1 MMOL/L
PROT SERPL-MCNC: 7.3 G/DL
SODIUM SERPL-SCNC: 139 MMOL/L
TRIGL SERPL-MCNC: 176 MG/DL

## 2024-02-29 ENCOUNTER — NON-APPOINTMENT (OUTPATIENT)
Age: 64
End: 2024-02-29

## 2024-02-29 ENCOUNTER — APPOINTMENT (OUTPATIENT)
Dept: CARDIOLOGY | Facility: CLINIC | Age: 64
End: 2024-02-29
Payer: MEDICAID

## 2024-02-29 VITALS
DIASTOLIC BLOOD PRESSURE: 91 MMHG | SYSTOLIC BLOOD PRESSURE: 165 MMHG | RESPIRATION RATE: 18 BRPM | TEMPERATURE: 97.3 F | OXYGEN SATURATION: 97 % | BODY MASS INDEX: 30.51 KG/M2 | HEART RATE: 67 BPM | WEIGHT: 175 LBS

## 2024-02-29 DIAGNOSIS — F41.9 ANXIETY DISORDER, UNSPECIFIED: ICD-10-CM

## 2024-02-29 DIAGNOSIS — R07.89 OTHER CHEST PAIN: ICD-10-CM

## 2024-02-29 DIAGNOSIS — I10 ESSENTIAL (PRIMARY) HYPERTENSION: ICD-10-CM

## 2024-02-29 LAB — APO LP(A) SERPL-MCNC: 90.1 NMOL/L

## 2024-02-29 PROCEDURE — 93000 ELECTROCARDIOGRAM COMPLETE: CPT

## 2024-02-29 PROCEDURE — 99214 OFFICE O/P EST MOD 30 MIN: CPT | Mod: 25

## 2024-02-29 RX ORDER — ALPRAZOLAM 0.25 MG/1
0.25 TABLET ORAL DAILY
Qty: 15 | Refills: 0 | Status: ACTIVE | COMMUNITY
Start: 2024-02-29 | End: 1900-01-01

## 2024-02-29 RX ORDER — OMEPRAZOLE 40 MG/1
40 CAPSULE, DELAYED RELEASE ORAL
Qty: 30 | Refills: 1 | Status: ACTIVE | COMMUNITY
Start: 2021-12-29 | End: 1900-01-01

## 2024-02-29 RX ORDER — AMLODIPINE BESYLATE 5 MG/1
5 TABLET ORAL
Qty: 60 | Refills: 5 | Status: ACTIVE | COMMUNITY
Start: 1900-01-01 | End: 1900-01-01

## 2024-02-29 NOTE — PHYSICAL EXAM
[Normal Appearance] : normal appearance [General Appearance - Well Developed] : well developed [General Appearance - Well Nourished] : well nourished [Well Groomed] : well groomed [No Deformities] : no deformities [Normal Conjunctiva] : the conjunctiva exhibited no abnormalities [General Appearance - In No Acute Distress] : no acute distress [Eyelids - No Xanthelasma] : the eyelids demonstrated no xanthelasmas [Normal Oral Mucosa] : normal oral mucosa [No Oral Cyanosis] : no oral cyanosis [No Oral Pallor] : no oral pallor [Normal Jugular Venous A Waves Present] : normal jugular venous A waves present [Normal Jugular Venous V Waves Present] : normal jugular venous V waves present [No Jugular Venous Dumas A Waves] : no jugular venous dumas A waves [Respiration, Rhythm And Depth] : normal respiratory rhythm and effort [Exaggerated Use Of Accessory Muscles For Inspiration] : no accessory muscle use [Auscultation Breath Sounds / Voice Sounds] : lungs were clear to auscultation bilaterally [Heart Rate And Rhythm] : heart rate and rhythm were normal [Heart Sounds] : normal S1 and S2 [Arterial Pulses Normal] : the arterial pulses were normal [Murmurs] : no murmurs present [Abdomen Soft] : soft [Edema] : no peripheral edema present [Abdomen Tenderness] : non-tender [Abdomen Mass (___ Cm)] : no abdominal mass palpated [Abnormal Walk] : normal gait [Gait - Sufficient For Exercise Testing] : the gait was sufficient for exercise testing [Nail Clubbing] : no clubbing of the fingernails [Cyanosis, Localized] : no localized cyanosis [Petechial Hemorrhages (___cm)] : no petechial hemorrhages [] : no ischemic changes [Oriented To Time, Place, And Person] : oriented to person, place, and time [Affect] : the affect was normal [No Anxiety] : not feeling anxious [Mood] : the mood was normal

## 2024-02-29 NOTE — REASON FOR VISIT
[FreeTextEntry1] : 64 year-old female with PAT,  HTN, DM (6.9), HLD (), presents for followup.    Patient was last seen on 2/14/24 - Patient was advised to return for followup because of elevated LDL of 202. Patient reports strong family history of CAD. She was on Simvastatin 20 mg which was not effective. She was switched to Rosuvastatin 10 mg which gave her myalgia. I advised patient to try Nexletol. If not effective, she will then need PCSK9i. But pt feels like Simvastatin 20 mg was working until she was switched to a generic. I offered her to try Zocor 40 mg first. If not effective, then Nexletol, and then PCSK9i if needed. Patient reports SSCP associated with elevated BP. I advised patient to consider CTA of the coronary arteries given her strong family history. Pt will consider.  She is on Valsartan 160 mg QD and Amlodipine 5 mg for HTN.  She is OFF Rosuvastatin.  She is on Jardiance 10 mg for DM.  She is OFF HCTZ 12.5 mg for HTN.    Patient underwent an echocardiogram 11/17/22 and it showed normal LV function, moderate LVH, without significant valvular pathology.   Patient underwent a treadmill stress test 11/17/22 and completed 4.5 minutes of Dany protocol.  There were upsloping ST depressions on ECG but no symptoms.  Following treadmill stress, there was no echocardiographic evidence of ischemia.  Hypertensive response noted.   Patient wore a Holter 3/22/22 and it showed average HR 49, 1 PVC, rare PACs, couplets and 49 episodes of PAT, the longest 8 beats at a rate of 122, the fastest 3 beats at a rate of 129. No pause. No treatment needed for now.   Patient underwent an echocardiogram 10/12/21 and it showed normal LV function without significant valvular pathology.   Patient underwent a treadmill stress test 10/12/21 and completed 5 minutes of Dany protocol.  There were upsloping ST depressions on ECG but no symptoms.  Following treadmill stress, there was no echocardiographic evidence of ischemia.  Her symptom was felt to be musculoskeletal in etiology.

## 2024-02-29 NOTE — CARDIOLOGY SUMMARY
[___] : [unfilled] [de-identified] : \par  Patient underwent a treadmill stress test 5/7/19  and completed 4 minutes of Dany protocol.  There were upsloping ST depressions on ECG but no symptoms.  Following treadmill stress, there was no echocardiographic evidence of ischemia. \par   [de-identified] : Patient underwent an echocardiogram 5/7/19 and it showed normal LV function without significant valvular pathology.

## 2024-02-29 NOTE — HISTORY OF PRESENT ILLNESS
[FreeTextEntry1] : 2/29/24 - Patient reports that for the past 2 days she has been experiencing labile BP up to 200, associated with diaphoresis.  Patient reports occasional lower chest pain radiating to the back.  She is on Valsartan 160 mg QD and Amlodipine 5 mg for HTN.  /91 HR 67.  Exam showed epigastric tenderness.  ECG showed no ischemic changes.  I advised patient to increase Amlodipine 5 mg to BID.  Patient is willing to proceed with CTA of the coronary arteries.  She may try Xanax 0.25 mg when very anxious.  I advised patient to resume Omeprazole 40 mg QD.    2/14/24 - Patient was advised to return for followup because of elevated LDL of 202.  Patient reports strong family history of CAD.  She was on Simvastatin 20 mg which was not effective.  She was switched to Rosuvastatin 10 mg which gave her myalgia.  I advised patient to try Nexletol.  If not effective, she will then need PCSK9i.  But pt feels like Simvastatin 20 mg was working until she was switched to a generic.  I offered her to try Zocor 40 mg first.  If not effective, then Nexletol, and then PCSK9i if needed.  Patient reports SSCP associated with elevated BP.  I advised patient to consider CTA of the coronary arteries given her strong family history.  Pt will consider.  2/9/23 - Patient still has cold symptoms with cough. She reports palpitations with HR of 100s at night for the last 2 weeks. Patient reports elevated BP of 180/190 and HR of 100 this morning when she was having a bm. I advised patient to start on Propranolol 10 mg BID as needed for palpitations. I advised patient to start on Amlodipine 5 mg as needed for elevated BP.  Patient wore a 7-day monitor and it showed average HR 61, 1 PVC, rare PACs, couplets and 160s PATs, the fastest at a rate of 132, the longest 9 beats. No A. Fib.  12/8/22 - Pt has stopped Amlodipine 5 mg for 2 days due to recurred Amlodipine allergy (swelling gum). She resumed Valsartan 160 mg. Her BP was well controlled when she was on Amlodipine (120s/-). She reports BP this morning was a little elevated (140s/-). Pt is on Simvastatin 20 mg. She held it for 10 days in October due to use of Paxlovid. Her LDL was 249 on 11/14. She would like to repeat lipid panel.  11/17/22 - Pt is here for high blood pressure. Pt is on Amlodipine 5 mg. She stopped taking Valsartan 160 mg due to her diastolic BP 50s. Her BP at home up to 160-170s/-. She was nervous due to elevated BP. She went to see Psych, and started Trazodone, Clonazepam, and Lexapro.  Patient underwent an echocardiogram and it showed normal LV function, moderate LVH, without significant valvular pathology. Patient underwent a treadmill stress test and completed 4.5 minutes of Dany protocol.  There were upsloping ST depressions on ECG but no symptoms.  Following treadmill stress, there was no echocardiographic evidence of ischemia.  Hypertensive response noted.   11/9/22- Patient contracted COVID 20 days ago but she has been feeling palpitations with fast HR of 70-80 in the mornings. Patient reports that she had one day of fever and she took Paxlovid and had allergic reaction to it and her BP went up to 200 and went to ER. Patient also reports fast HR with exertion. Her HR is normally slow. Patient also reports back pain. I advised patient to undergo an echocardiogram and a treadmill stress test. I advised patient to wear a Holter monitor.     3/22/22 - Patient had check up with PCP last week and was found to have bradycardia HR in the 40s. Patient felt sick after getting third dose of vaccine with epigastric discomfort better after one week on Omeprazole. Patient is on Valsartan 80 mg BID and reports that her BP at home is 120/80. Patient denies CP, SOB, palpitations, or lightheadedness. I advised patient to wear a Holter monitor. I advised patient to exercise more.  Patient wore a Holter and it showed average HR 49, 1 PVC, rare PACs, couplets and 49 episodes of PAT, the longest 8 beats at a rate of 122, the fastest 3 beats at a rate of 129. No pause. No treatment needed for now.   10/12/21 - Patient reports that she had CXR done which showed calcifications. Patient has been on cholesterol medications. Patient reports L sided CP for 6 months not related to exertion, tender to touch.  Her mammogram was normal. I advised patient to undergo an echocardiogram and a treadmill stress test.  Patient underwent an echocardiogram and it showed normal LV function without significant valvular pathology. Patient underwent a treadmill stress test and completed 5 minutes of Dany protocol.  There were upsloping ST depressions on ECG but no symptoms.  Following treadmill stress, there was no echocardiographic evidence of ischemia.  Patient was reassured.  Her symptom may be musculoskeletal in etiology.   5/7/19 - Patient reports that her BP is usually in the 50s, but now a days in the AM her HR is 70, 80s and it is very uncomfortable for her. Patient has many allergies to different medications. Her BP is high at 160.  She reports CP, back pain.

## 2024-03-26 RX ORDER — PROPRANOLOL HYDROCHLORIDE 10 MG/1
10 TABLET ORAL TWICE DAILY
Qty: 30 | Refills: 1 | Status: DISCONTINUED | COMMUNITY
Start: 2023-02-09 | End: 2024-03-26

## 2024-03-26 RX ORDER — HYDROCHLOROTHIAZIDE 12.5 MG/1
12.5 TABLET ORAL
Qty: 30 | Refills: 5 | Status: DISCONTINUED | COMMUNITY
Start: 2022-12-27 | End: 2024-03-26

## 2024-04-10 ENCOUNTER — APPOINTMENT (OUTPATIENT)
Dept: CARDIOLOGY | Facility: CLINIC | Age: 64
End: 2024-04-10

## 2024-04-17 ENCOUNTER — APPOINTMENT (OUTPATIENT)
Dept: CARDIOLOGY | Facility: CLINIC | Age: 64
End: 2024-04-17
Payer: MEDICAID

## 2024-04-17 VITALS
DIASTOLIC BLOOD PRESSURE: 70 MMHG | BODY MASS INDEX: 30.51 KG/M2 | OXYGEN SATURATION: 97 % | SYSTOLIC BLOOD PRESSURE: 131 MMHG | WEIGHT: 175 LBS | TEMPERATURE: 98.1 F | HEART RATE: 48 BPM | RESPIRATION RATE: 18 BRPM

## 2024-04-17 DIAGNOSIS — R00.1 BRADYCARDIA, UNSPECIFIED: ICD-10-CM

## 2024-04-17 DIAGNOSIS — R93.1 ABNORMAL FINDINGS ON DIAGNOSTIC IMAGING OF HEART AND CORONARY CIRCULATION: ICD-10-CM

## 2024-04-17 DIAGNOSIS — I25.10 ATHEROSCLEROTIC HEART DISEASE OF NATIVE CORONARY ARTERY W/OUT ANGINA PECTORIS: ICD-10-CM

## 2024-04-17 PROCEDURE — 99213 OFFICE O/P EST LOW 20 MIN: CPT

## 2024-04-17 NOTE — PHYSICAL EXAM
[General Appearance - Well Developed] : well developed [Normal Appearance] : normal appearance [Well Groomed] : well groomed [General Appearance - Well Nourished] : well nourished [No Deformities] : no deformities [General Appearance - In No Acute Distress] : no acute distress [Normal Conjunctiva] : the conjunctiva exhibited no abnormalities [Eyelids - No Xanthelasma] : the eyelids demonstrated no xanthelasmas [Normal Oral Mucosa] : normal oral mucosa [No Oral Pallor] : no oral pallor [No Oral Cyanosis] : no oral cyanosis [Normal Jugular Venous A Waves Present] : normal jugular venous A waves present [Normal Jugular Venous V Waves Present] : normal jugular venous V waves present [No Jugular Venous Dumas A Waves] : no jugular venous dumas A waves [Respiration, Rhythm And Depth] : normal respiratory rhythm and effort [Exaggerated Use Of Accessory Muscles For Inspiration] : no accessory muscle use [Auscultation Breath Sounds / Voice Sounds] : lungs were clear to auscultation bilaterally [Heart Rate And Rhythm] : heart rate and rhythm were normal [Heart Sounds] : normal S1 and S2 [Murmurs] : no murmurs present [Arterial Pulses Normal] : the arterial pulses were normal [Edema] : no peripheral edema present [Abdomen Soft] : soft [Abdomen Tenderness] : non-tender [Abdomen Mass (___ Cm)] : no abdominal mass palpated [Gait - Sufficient For Exercise Testing] : the gait was sufficient for exercise testing [Abnormal Walk] : normal gait [Nail Clubbing] : no clubbing of the fingernails [Cyanosis, Localized] : no localized cyanosis [Petechial Hemorrhages (___cm)] : no petechial hemorrhages [] : no ischemic changes [Oriented To Time, Place, And Person] : oriented to person, place, and time [Affect] : the affect was normal [Mood] : the mood was normal [No Anxiety] : not feeling anxious

## 2024-05-09 ENCOUNTER — APPOINTMENT (OUTPATIENT)
Dept: CARDIOLOGY | Facility: CLINIC | Age: 64
End: 2024-05-09
Payer: MEDICAID

## 2024-05-09 ENCOUNTER — NON-APPOINTMENT (OUTPATIENT)
Age: 64
End: 2024-05-09

## 2024-05-09 VITALS
SYSTOLIC BLOOD PRESSURE: 154 MMHG | BODY MASS INDEX: 29.99 KG/M2 | OXYGEN SATURATION: 97 % | RESPIRATION RATE: 18 BRPM | WEIGHT: 172 LBS | HEART RATE: 63 BPM | DIASTOLIC BLOOD PRESSURE: 89 MMHG

## 2024-05-09 PROCEDURE — 99213 OFFICE O/P EST LOW 20 MIN: CPT

## 2024-05-09 NOTE — PHYSICAL EXAM
[General Appearance - Well Developed] : well developed [Normal Appearance] : normal appearance [Well Groomed] : well groomed [General Appearance - Well Nourished] : well nourished [No Deformities] : no deformities [General Appearance - In No Acute Distress] : no acute distress [Normal Conjunctiva] : the conjunctiva exhibited no abnormalities [Eyelids - No Xanthelasma] : the eyelids demonstrated no xanthelasmas [Normal Oral Mucosa] : normal oral mucosa [No Oral Pallor] : no oral pallor [No Oral Cyanosis] : no oral cyanosis [Normal Jugular Venous A Waves Present] : normal jugular venous A waves present [Normal Jugular Venous V Waves Present] : normal jugular venous V waves present [No Jugular Venous Dumas A Waves] : no jugular venous dumas A waves [Respiration, Rhythm And Depth] : normal respiratory rhythm and effort [Exaggerated Use Of Accessory Muscles For Inspiration] : no accessory muscle use [Auscultation Breath Sounds / Voice Sounds] : lungs were clear to auscultation bilaterally [Heart Rate And Rhythm] : heart rate and rhythm were normal [Heart Sounds] : normal S1 and S2 [Murmurs] : no murmurs present [Arterial Pulses Normal] : the arterial pulses were normal [Edema] : no peripheral edema present [Abdomen Soft] : soft [Abdomen Tenderness] : non-tender [Abdomen Mass (___ Cm)] : no abdominal mass palpated [Abnormal Walk] : normal gait [Gait - Sufficient For Exercise Testing] : the gait was sufficient for exercise testing [Nail Clubbing] : no clubbing of the fingernails [Cyanosis, Localized] : no localized cyanosis [Petechial Hemorrhages (___cm)] : no petechial hemorrhages [] : no ischemic changes [Affect] : the affect was normal [Oriented To Time, Place, And Person] : oriented to person, place, and time [Mood] : the mood was normal [No Anxiety] : not feeling anxious

## 2024-05-13 LAB
ALBUMIN SERPL ELPH-MCNC: 4.5 G/DL
ALP BLD-CCNC: 66 U/L
ALT SERPL-CCNC: 11 U/L
ANION GAP SERPL CALC-SCNC: 14 MMOL/L
AST SERPL-CCNC: 13 U/L
BILIRUB SERPL-MCNC: 0.4 MG/DL
BUN SERPL-MCNC: 18 MG/DL
CALCIUM SERPL-MCNC: 9.3 MG/DL
CHLORIDE SERPL-SCNC: 104 MMOL/L
CHOLEST SERPL-MCNC: 200 MG/DL
CO2 SERPL-SCNC: 24 MMOL/L
CREAT SERPL-MCNC: 0.77 MG/DL
EGFR: 86 ML/MIN/1.73M2
GLUCOSE SERPL-MCNC: 268 MG/DL
HDLC SERPL-MCNC: 69 MG/DL
LDLC SERPL CALC-MCNC: 114 MG/DL
NONHDLC SERPL-MCNC: 131 MG/DL
POTASSIUM SERPL-SCNC: 3.7 MMOL/L
PROT SERPL-MCNC: 7.5 G/DL
SODIUM SERPL-SCNC: 142 MMOL/L
TRIGL SERPL-MCNC: 99 MG/DL

## 2024-05-14 ENCOUNTER — NON-APPOINTMENT (OUTPATIENT)
Age: 64
End: 2024-05-14

## 2024-05-15 PROBLEM — I25.10 CORONARY ARTERY DISEASE INVOLVING NATIVE CORONARY ARTERY OF NATIVE HEART, UNSPECIFIED WHETHER ANGINA PRESENT: Status: ACTIVE | Noted: 2024-05-15

## 2024-05-15 PROBLEM — R00.1 BRADYCARDIA: Status: ACTIVE | Noted: 2022-03-22

## 2024-05-15 PROBLEM — R93.1 ABNORMAL CARDIAC CT ANGIOGRAPHY: Status: ACTIVE | Noted: 2024-05-15

## 2024-05-15 NOTE — CARDIOLOGY SUMMARY
[___] : [unfilled] [de-identified] : \par  Patient underwent a treadmill stress test 5/7/19  and completed 4 minutes of Dany protocol.  There were upsloping ST depressions on ECG but no symptoms.  Following treadmill stress, there was no echocardiographic evidence of ischemia. \par   [de-identified] : Patient underwent an echocardiogram 5/7/19 and it showed normal LV function without significant valvular pathology.

## 2024-05-15 NOTE — HISTORY OF PRESENT ILLNESS
[FreeTextEntry1] : 4/17/24 - CTA of the coronary arteries showed mild LAD stenosis with calcium score of 33. Chest CT portion normal. She is on Simvastatin 40 mg with no myalgia.. Patient has A1C of 7.2, .  Pt has slow HR.  /70 HR 48.  Patient is not on any eyedrops.  Exam unremarkable.  I advised patient to wear 7 day Event monitor.  Pt wished to defer.  2/29/24 - Patient reports that for the past 2 days she has been experiencing labile BP up to 200, associated with diaphoresis.  Patient reports occasional lower chest pain radiating to the back.  She is on Valsartan 160 mg QD and Amlodipine 5 mg for HTN.  /91 HR 67.  Exam showed epigastric tenderness.  ECG showed no ischemic changes.  I advised patient to increase Amlodipine 5 mg to BID.  Patient is willing to proceed with CTA of the coronary arteries.  She may try Xanax 0.25 mg when very anxious.  I advised patient to resume Omeprazole 40 mg QD.    2/14/24 - Patient was advised to return for followup because of elevated LDL of 202.  Patient reports strong family history of CAD.  She was on Simvastatin 20 mg which was not effective.  She was switched to Rosuvastatin 10 mg which gave her myalgia.  I advised patient to try Nexletol.  If not effective, she will then need PCSK9i.  But pt feels like Simvastatin 20 mg was working until she was switched to a generic.  I offered her to try Zocor 40 mg first.  If not effective, then Nexletol, and then PCSK9i if needed.  Patient reports SSCP associated with elevated BP.  I advised patient to consider CTA of the coronary arteries given her strong family history.  Pt will consider.  2/9/23 - Patient still has cold symptoms with cough. She reports palpitations with HR of 100s at night for the last 2 weeks. Patient reports elevated BP of 180/190 and HR of 100 this morning when she was having a bm. I advised patient to start on Propranolol 10 mg BID as needed for palpitations. I advised patient to start on Amlodipine 5 mg as needed for elevated BP.  Patient wore a 7-day monitor and it showed average HR 61, 1 PVC, rare PACs, couplets and 160s PATs, the fastest at a rate of 132, the longest 9 beats. No A. Fib.  12/8/22 - Pt has stopped Amlodipine 5 mg for 2 days due to recurred Amlodipine allergy (swelling gum). She resumed Valsartan 160 mg. Her BP was well controlled when she was on Amlodipine (120s/-). She reports BP this morning was a little elevated (140s/-). Pt is on Simvastatin 20 mg. She held it for 10 days in October due to use of Paxlovid. Her LDL was 249 on 11/14. She would like to repeat lipid panel.  11/17/22 - Pt is here for high blood pressure. Pt is on Amlodipine 5 mg. She stopped taking Valsartan 160 mg due to her diastolic BP 50s. Her BP at home up to 160-170s/-. She was nervous due to elevated BP. She went to see Psych, and started Trazodone, Clonazepam, and Lexapro.  Patient underwent an echocardiogram and it showed normal LV function, moderate LVH, without significant valvular pathology. Patient underwent a treadmill stress test and completed 4.5 minutes of Dany protocol.  There were upsloping ST depressions on ECG but no symptoms.  Following treadmill stress, there was no echocardiographic evidence of ischemia.  Hypertensive response noted.   11/9/22- Patient contracted COVID 20 days ago but she has been feeling palpitations with fast HR of 70-80 in the mornings. Patient reports that she had one day of fever and she took Paxlovid and had allergic reaction to it and her BP went up to 200 and went to ER. Patient also reports fast HR with exertion. Her HR is normally slow. Patient also reports back pain. I advised patient to undergo an echocardiogram and a treadmill stress test. I advised patient to wear a Holter monitor.     3/22/22 - Patient had check up with PCP last week and was found to have bradycardia HR in the 40s. Patient felt sick after getting third dose of vaccine with epigastric discomfort better after one week on Omeprazole. Patient is on Valsartan 80 mg BID and reports that her BP at home is 120/80. Patient denies CP, SOB, palpitations, or lightheadedness. I advised patient to wear a Holter monitor. I advised patient to exercise more.  Patient wore a Holter and it showed average HR 49, 1 PVC, rare PACs, couplets and 49 episodes of PAT, the longest 8 beats at a rate of 122, the fastest 3 beats at a rate of 129. No pause. No treatment needed for now.   10/12/21 - Patient reports that she had CXR done which showed calcifications. Patient has been on cholesterol medications. Patient reports L sided CP for 6 months not related to exertion, tender to touch.  Her mammogram was normal. I advised patient to undergo an echocardiogram and a treadmill stress test.  Patient underwent an echocardiogram and it showed normal LV function without significant valvular pathology. Patient underwent a treadmill stress test and completed 5 minutes of Dany protocol.  There were upsloping ST depressions on ECG but no symptoms.  Following treadmill stress, there was no echocardiographic evidence of ischemia.  Patient was reassured.  Her symptom may be musculoskeletal in etiology.   5/7/19 - Patient reports that her BP is usually in the 50s, but now a days in the AM her HR is 70, 80s and it is very uncomfortable for her. Patient has many allergies to different medications. Her BP is high at 160.  She reports CP, back pain.

## 2024-05-15 NOTE — REASON FOR VISIT
[FreeTextEntry1] : 64 year-old female with PAT,  HTN, DM (6.9), HLD (), presents for followup.    Patient was last seen on 2/29/24 - Patient reports that for the past 2 days she has been experiencing labile BP up to 200, associated with diaphoresis. Patient reports occasional lower chest pain radiating to the back. She is on Valsartan 160 mg QD and Amlodipine 5 mg for HTN. /91 HR 67. Exam showed epigastric tenderness. ECG showed no ischemic changes. I advised patient to increase Amlodipine 5 mg to BID. Patient is willing to proceed with CTA of the coronary arteries. She may try Xanax 0.25 mg when very anxious. I advised patient to resume Omeprazole 40 mg QD.  CTA of the coronary arteries showed mild LAD stenosis with calcium score of 33. Chest CT portion normal. Will advise pt to resume statin therapy.   She is on Valsartan 160 mg QD and Amlodipine 5 mg for HTN.  She is on Simvastatin 40 mg with no myalgia.  She is OFF Rosuvastatin.  She is on Jardiance 10 mg for DM.  She is OFF HCTZ 12.5 mg for HTN.    Patient underwent an echocardiogram 11/17/22 and it showed normal LV function, moderate LVH, without significant valvular pathology.   Patient underwent a treadmill stress test 11/17/22 and completed 4.5 minutes of Dany protocol.  There were upsloping ST depressions on ECG but no symptoms.  Following treadmill stress, there was no echocardiographic evidence of ischemia.  Hypertensive response noted.   Patient wore a Holter 3/22/22 and it showed average HR 49, 1 PVC, rare PACs, couplets and 49 episodes of PAT, the longest 8 beats at a rate of 122, the fastest 3 beats at a rate of 129. No pause. No treatment needed for now.   Patient underwent an echocardiogram 10/12/21 and it showed normal LV function without significant valvular pathology.   Patient underwent a treadmill stress test 10/12/21 and completed 5 minutes of Dany protocol.  There were upsloping ST depressions on ECG but no symptoms.  Following treadmill stress, there was no echocardiographic evidence of ischemia.  Her symptom was felt to be musculoskeletal in etiology.

## 2024-05-28 NOTE — CARDIOLOGY SUMMARY
[___] : [unfilled] [de-identified] : \par  Patient underwent a treadmill stress test 5/7/19  and completed 4 minutes of Dany protocol.  There were upsloping ST depressions on ECG but no symptoms.  Following treadmill stress, there was no echocardiographic evidence of ischemia. \par   [de-identified] : Patient underwent an echocardiogram 5/7/19 and it showed normal LV function without significant valvular pathology.

## 2024-05-28 NOTE — REASON FOR VISIT
[FreeTextEntry1] : 64 year-old female with PAT,  HTN, DM (6.9), HLD (), presents for followup.    Patient was last seen on 4/17/24 - CTA of the coronary arteries showed mild LAD stenosis with calcium score of 33. Chest CT portion normal. Will advise pt to resume statin therapy. Patient has A1C of 7.2, . Patient is not on any eyedrops. I advised patient to wear 7 day Event monitor, patient will defer.  She is on Valsartan 160 mg QD and Amlodipine 5 mg for HTN.  She is OFF Rosuvastatin.  She is on Jardiance 10 mg for DM.  She is OFF HCTZ 12.5 mg for HTN.    CTA of the coronary arteries 4/3/24 showed mild LAD stenosis with calcium score of 33. Chest CT portion normal. Will advise pt to resume statin therapy.   Patient underwent an echocardiogram 11/17/22 and it showed normal LV function, moderate LVH, without significant valvular pathology.   Patient underwent a treadmill stress test 11/17/22 and completed 4.5 minutes of Dany protocol.  There were upsloping ST depressions on ECG but no symptoms.  Following treadmill stress, there was no echocardiographic evidence of ischemia.  Hypertensive response noted.   Patient wore a Holter 3/22/22 and it showed average HR 49, 1 PVC, rare PACs, couplets and 49 episodes of PAT, the longest 8 beats at a rate of 122, the fastest 3 beats at a rate of 129. No pause. No treatment needed for now.   Patient underwent an echocardiogram 10/12/21 and it showed normal LV function without significant valvular pathology.   Patient underwent a treadmill stress test 10/12/21 and completed 5 minutes of Dany protocol.  There were upsloping ST depressions on ECG but no symptoms.  Following treadmill stress, there was no echocardiographic evidence of ischemia.  Her symptom was felt to be musculoskeletal in etiology.

## 2024-05-28 NOTE — HISTORY OF PRESENT ILLNESS
[FreeTextEntry1] : 5/9/24 - Please refer to NP note below.  Pt reports dizziness and palpitation yesterday and today after nap, with left leg numbness and elevated /90, lasting for a few hours, sweating, she felt better after took meds for dizziness. Patient denies CP or SOB. Patient denies h/o syncope. Today's /89 P 63, Repeated 164/82 P 63. Pt is on Valsartan 160 mg QD and Amlodipine 5 mg for HTN. She is on Jardiance 10 mg and Simvastatin 40mg.  Patient has been off Valsartan for one month.    4/17/24 - CTA of the coronary arteries showed mild LAD stenosis with calcium score of 33. Chest CT portion normal. Will advise pt to resume statin therapy. Patient has A1C of 7.2, . Patient is not on any eyedrops.  I advised patient to wear 7 day Event monitor, patient will defer.   2/29/24 - Patient reports that for the past 2 days she has been experiencing labile BP up to 200, associated with diaphoresis.  Patient reports occasional lower chest pain radiating to the back.  She is on Valsartan 160 mg QD and Amlodipine 5 mg for HTN.  /91 HR 67.  Exam showed epigastric tenderness.  ECG showed no ischemic changes.  I advised patient to increase Amlodipine 5 mg to BID.  Patient is willing to proceed with CTA of the coronary arteries.  She may try Xanax 0.25 mg when very anxious.  I advised patient to resume Omeprazole 40 mg QD.    2/14/24 - Patient was advised to return for followup because of elevated LDL of 202.  Patient reports strong family history of CAD.  She was on Simvastatin 20 mg which was not effective.  She was switched to Rosuvastatin 10 mg which gave her myalgia.  I advised patient to try Nexletol.  If not effective, she will then need PCSK9i.  But pt feels like Simvastatin 20 mg was working until she was switched to a generic.  I offered her to try Zocor 40 mg first.  If not effective, then Nexletol, and then PCSK9i if needed.  Patient reports SSCP associated with elevated BP.  I advised patient to consider CTA of the coronary arteries given her strong family history.  Pt will consider.  2/9/23 - Patient still has cold symptoms with cough. She reports palpitations with HR of 100s at night for the last 2 weeks. Patient reports elevated BP of 180/190 and HR of 100 this morning when she was having a bm. I advised patient to start on Propranolol 10 mg BID as needed for palpitations. I advised patient to start on Amlodipine 5 mg as needed for elevated BP.  Patient wore a 7-day monitor and it showed average HR 61, 1 PVC, rare PACs, couplets and 160s PATs, the fastest at a rate of 132, the longest 9 beats. No A. Fib.  12/8/22 - Pt has stopped Amlodipine 5 mg for 2 days due to recurred Amlodipine allergy (swelling gum). She resumed Valsartan 160 mg. Her BP was well controlled when she was on Amlodipine (120s/-). She reports BP this morning was a little elevated (140s/-). Pt is on Simvastatin 20 mg. She held it for 10 days in October due to use of Paxlovid. Her LDL was 249 on 11/14. She would like to repeat lipid panel.  11/17/22 - Pt is here for high blood pressure. Pt is on Amlodipine 5 mg. She stopped taking Valsartan 160 mg due to her diastolic BP 50s. Her BP at home up to 160-170s/-. She was nervous due to elevated BP. She went to see Psych, and started Trazodone, Clonazepam, and Lexapro.  Patient underwent an echocardiogram and it showed normal LV function, moderate LVH, without significant valvular pathology. Patient underwent a treadmill stress test and completed 4.5 minutes of Dany protocol.  There were upsloping ST depressions on ECG but no symptoms.  Following treadmill stress, there was no echocardiographic evidence of ischemia.  Hypertensive response noted.   11/9/22- Patient contracted COVID 20 days ago but she has been feeling palpitations with fast HR of 70-80 in the mornings. Patient reports that she had one day of fever and she took Paxlovid and had allergic reaction to it and her BP went up to 200 and went to ER. Patient also reports fast HR with exertion. Her HR is normally slow. Patient also reports back pain. I advised patient to undergo an echocardiogram and a treadmill stress test. I advised patient to wear a Holter monitor.     3/22/22 - Patient had check up with PCP last week and was found to have bradycardia HR in the 40s. Patient felt sick after getting third dose of vaccine with epigastric discomfort better after one week on Omeprazole. Patient is on Valsartan 80 mg BID and reports that her BP at home is 120/80. Patient denies CP, SOB, palpitations, or lightheadedness. I advised patient to wear a Holter monitor. I advised patient to exercise more.  Patient wore a Holter and it showed average HR 49, 1 PVC, rare PACs, couplets and 49 episodes of PAT, the longest 8 beats at a rate of 122, the fastest 3 beats at a rate of 129. No pause. No treatment needed for now.   10/12/21 - Patient reports that she had CXR done which showed calcifications. Patient has been on cholesterol medications. Patient reports L sided CP for 6 months not related to exertion, tender to touch.  Her mammogram was normal. I advised patient to undergo an echocardiogram and a treadmill stress test.  Patient underwent an echocardiogram and it showed normal LV function without significant valvular pathology. Patient underwent a treadmill stress test and completed 5 minutes of Dany protocol.  There were upsloping ST depressions on ECG but no symptoms.  Following treadmill stress, there was no echocardiographic evidence of ischemia.  Patient was reassured.  Her symptom may be musculoskeletal in etiology.   5/7/19 - Patient reports that her BP is usually in the 50s, but now a days in the AM her HR is 70, 80s and it is very uncomfortable for her. Patient has many allergies to different medications. Her BP is high at 160.  She reports CP, back pain.

## 2024-08-20 ENCOUNTER — APPOINTMENT (OUTPATIENT)
Dept: CARDIOLOGY | Facility: CLINIC | Age: 64
End: 2024-08-20
Payer: MEDICAID

## 2024-08-20 VITALS
WEIGHT: 170 LBS | OXYGEN SATURATION: 98 % | RESPIRATION RATE: 16 BRPM | BODY MASS INDEX: 29.64 KG/M2 | DIASTOLIC BLOOD PRESSURE: 73 MMHG | SYSTOLIC BLOOD PRESSURE: 137 MMHG | HEART RATE: 52 BPM

## 2024-08-20 DIAGNOSIS — E78.5 HYPERLIPIDEMIA, UNSPECIFIED: ICD-10-CM

## 2024-08-20 DIAGNOSIS — I47.19 OTHER SUPRAVENTRICULAR TACHYCARDIA: ICD-10-CM

## 2024-08-20 DIAGNOSIS — I10 ESSENTIAL (PRIMARY) HYPERTENSION: ICD-10-CM

## 2024-08-20 PROCEDURE — 99214 OFFICE O/P EST MOD 30 MIN: CPT

## 2024-08-21 LAB
ALBUMIN SERPL ELPH-MCNC: 4.4 G/DL
ALP BLD-CCNC: 66 U/L
ALT SERPL-CCNC: 7 U/L
ANION GAP SERPL CALC-SCNC: 11 MMOL/L
AST SERPL-CCNC: 14 U/L
BILIRUB SERPL-MCNC: 0.4 MG/DL
BUN SERPL-MCNC: 21 MG/DL
CALCIUM SERPL-MCNC: 8.9 MG/DL
CHLORIDE SERPL-SCNC: 101 MMOL/L
CHOLEST SERPL-MCNC: 196 MG/DL
CO2 SERPL-SCNC: 26 MMOL/L
CREAT SERPL-MCNC: 0.79 MG/DL
EGFR: 83 ML/MIN/1.73M2
ESTIMATED AVERAGE GLUCOSE: 157 MG/DL
GLUCOSE SERPL-MCNC: 207 MG/DL
HBA1C MFR BLD HPLC: 7.1 %
HDLC SERPL-MCNC: 58 MG/DL
LDLC SERPL CALC-MCNC: 109 MG/DL
NONHDLC SERPL-MCNC: 138 MG/DL
POTASSIUM SERPL-SCNC: 3.6 MMOL/L
PROT SERPL-MCNC: 7.4 G/DL
SODIUM SERPL-SCNC: 138 MMOL/L
TRIGL SERPL-MCNC: 171 MG/DL

## 2024-08-26 NOTE — CARDIOLOGY SUMMARY
[___] : [unfilled] [de-identified] : \par  Patient underwent a treadmill stress test 5/7/19  and completed 4 minutes of Dany protocol.  There were upsloping ST depressions on ECG but no symptoms.  Following treadmill stress, there was no echocardiographic evidence of ischemia. \par   [de-identified] : Patient underwent an echocardiogram 5/7/19 and it showed normal LV function without significant valvular pathology.

## 2024-08-26 NOTE — REASON FOR VISIT
[FreeTextEntry1] : 64 year-old female with PAT,  HTN, DM (6.9), HLD (), presents for followup.    Patient was last seen on 4/17/24 - CTA of the coronary arteries showed mild LAD stenosis with calcium score of 33. Chest CT portion normal. Will advise pt to resume statin therapy. Patient has A1C of 7.2, . Patient is not on any eyedrops. I advised patient to wear 7 day Event monitor, patient will defer.  She is on Valsartan 80 mg QD and Amlodipine 5 mg BID for HTN.  She is on Simvastatin 40 mg.  She is on Jardiance 10 mg for DM.  She is OFF HCTZ 12.5 mg for HTN.    CTA of the coronary arteries 4/3/24 showed mild LAD stenosis with calcium score of 33. Chest CT portion normal. Will advise pt to resume statin therapy.   Patient underwent an echocardiogram 11/17/22 and it showed normal LV function, moderate LVH, without significant valvular pathology.   Patient underwent a treadmill stress test 11/17/22 and completed 4.5 minutes of Dany protocol.  There were upsloping ST depressions on ECG but no symptoms.  Following treadmill stress, there was no echocardiographic evidence of ischemia.  Hypertensive response noted.   Patient wore a Holter 3/22/22 and it showed average HR 49, 1 PVC, rare PACs, couplets and 49 episodes of PAT, the longest 8 beats at a rate of 122, the fastest 3 beats at a rate of 129. No pause. No treatment needed for now.   Patient underwent an echocardiogram 10/12/21 and it showed normal LV function without significant valvular pathology.   Patient underwent a treadmill stress test 10/12/21 and completed 5 minutes of Dany protocol.  There were upsloping ST depressions on ECG but no symptoms.  Following treadmill stress, there was no echocardiographic evidence of ischemia.  Her symptom was felt to be musculoskeletal in etiology.

## 2024-08-26 NOTE — HISTORY OF PRESENT ILLNESS
[FreeTextEntry1] : 8/20/24 - Please refer to NP note below.  Pt is here for follow up.  Pt wants to check her LFT after increased dose of Simvastatin to 40mg.  Pt also wants to know when she will need to have PPM as her current HR is around 40s.  Pt reports SSCP if she walks too fast.  Pt also reports occasional dizziness at night when sleeping, non-spinning, lasting for a few seconds. Patient denies SOB or palpitations. Patient denies h/o syncope.  Pt is on Valsartan 80 mg QD and Amlodipine 5 mg BID for HTN. She is on Jardiance 10 mg for DM. She is on Simvastatin 40mg.  Her home SBP ranged 105-120. Today's /73 P 52.  Exam unremarkable.  I will check BT.  5/9/24 - Please refer to NP note below.  Pt reports dizziness and palpitation yesterday and today after nap, with left leg numbness and elevated /90, lasting for a few hours, sweating, she felt better after took meds for dizziness. Patient denies CP or SOB. Patient denies h/o syncope.  Pt is on Valsartan 160 mg QD and Amlodipine 5 mg for HTN. She is on Jardiance 10 mg and Simvastatin 40mg.Today's /89 P 63, Repeated 164/82 P 63.  Patient has been off Valsartan for one month.    4/17/24 - CTA of the coronary arteries showed mild LAD stenosis with calcium score of 33. Chest CT portion normal. Will advise pt to resume statin therapy. Patient has A1C of 7.2, . Patient is not on any eyedrops.  I advised patient to wear 7 day Event monitor, patient will defer.   2/29/24 - Patient reports that for the past 2 days she has been experiencing labile BP up to 200, associated with diaphoresis.  Patient reports occasional lower chest pain radiating to the back.  She is on Valsartan 160 mg QD and Amlodipine 5 mg for HTN.  /91 HR 67.  Exam showed epigastric tenderness.  ECG showed no ischemic changes.  I advised patient to increase Amlodipine 5 mg to BID.  Patient is willing to proceed with CTA of the coronary arteries.  She may try Xanax 0.25 mg when very anxious.  I advised patient to resume Omeprazole 40 mg QD.    2/14/24 - Patient was advised to return for followup because of elevated LDL of 202.  Patient reports strong family history of CAD.  She was on Simvastatin 20 mg which was not effective.  She was switched to Rosuvastatin 10 mg which gave her myalgia.  I advised patient to try Nexletol.  If not effective, she will then need PCSK9i.  But pt feels like Simvastatin 20 mg was working until she was switched to a generic.  I offered her to try Zocor 40 mg first.  If not effective, then Nexletol, and then PCSK9i if needed.  Patient reports SSCP associated with elevated BP.  I advised patient to consider CTA of the coronary arteries given her strong family history.  Pt will consider.  2/9/23 - Patient still has cold symptoms with cough. She reports palpitations with HR of 100s at night for the last 2 weeks. Patient reports elevated BP of 180/190 and HR of 100 this morning when she was having a bm. I advised patient to start on Propranolol 10 mg BID as needed for palpitations. I advised patient to start on Amlodipine 5 mg as needed for elevated BP.  Patient wore a 7-day monitor and it showed average HR 61, 1 PVC, rare PACs, couplets and 160s PATs, the fastest at a rate of 132, the longest 9 beats. No A. Fib.  12/8/22 - Pt has stopped Amlodipine 5 mg for 2 days due to recurred Amlodipine allergy (swelling gum). She resumed Valsartan 160 mg. Her BP was well controlled when she was on Amlodipine (120s/-). She reports BP this morning was a little elevated (140s/-). Pt is on Simvastatin 20 mg. She held it for 10 days in October due to use of Paxlovid. Her LDL was 249 on 11/14. She would like to repeat lipid panel.  11/17/22 - Pt is here for high blood pressure. Pt is on Amlodipine 5 mg. She stopped taking Valsartan 160 mg due to her diastolic BP 50s. Her BP at home up to 160-170s/-. She was nervous due to elevated BP. She went to see Psych, and started Trazodone, Clonazepam, and Lexapro.  Patient underwent an echocardiogram and it showed normal LV function, moderate LVH, without significant valvular pathology. Patient underwent a treadmill stress test and completed 4.5 minutes of Dany protocol.  There were upsloping ST depressions on ECG but no symptoms.  Following treadmill stress, there was no echocardiographic evidence of ischemia.  Hypertensive response noted.   11/9/22- Patient contracted COVID 20 days ago but she has been feeling palpitations with fast HR of 70-80 in the mornings. Patient reports that she had one day of fever and she took Paxlovid and had allergic reaction to it and her BP went up to 200 and went to ER. Patient also reports fast HR with exertion. Her HR is normally slow. Patient also reports back pain. I advised patient to undergo an echocardiogram and a treadmill stress test. I advised patient to wear a Holter monitor.     3/22/22 - Patient had check up with PCP last week and was found to have bradycardia HR in the 40s. Patient felt sick after getting third dose of vaccine with epigastric discomfort better after one week on Omeprazole. Patient is on Valsartan 80 mg BID and reports that her BP at home is 120/80. Patient denies CP, SOB, palpitations, or lightheadedness. I advised patient to wear a Holter monitor. I advised patient to exercise more.  Patient wore a Holter and it showed average HR 49, 1 PVC, rare PACs, couplets and 49 episodes of PAT, the longest 8 beats at a rate of 122, the fastest 3 beats at a rate of 129. No pause. No treatment needed for now.   10/12/21 - Patient reports that she had CXR done which showed calcifications. Patient has been on cholesterol medications. Patient reports L sided CP for 6 months not related to exertion, tender to touch.  Her mammogram was normal. I advised patient to undergo an echocardiogram and a treadmill stress test.  Patient underwent an echocardiogram and it showed normal LV function without significant valvular pathology. Patient underwent a treadmill stress test and completed 5 minutes of Dany protocol.  There were upsloping ST depressions on ECG but no symptoms.  Following treadmill stress, there was no echocardiographic evidence of ischemia.  Patient was reassured.  Her symptom may be musculoskeletal in etiology.   5/7/19 - Patient reports that her BP is usually in the 50s, but now a days in the AM her HR is 70, 80s and it is very uncomfortable for her. Patient has many allergies to different medications. Her BP is high at 160.  She reports CP, back pain.

## 2024-08-26 NOTE — CARDIOLOGY SUMMARY
[___] : [unfilled] [de-identified] : \par  Patient underwent a treadmill stress test 5/7/19  and completed 4 minutes of Dayn protocol.  There were upsloping ST depressions on ECG but no symptoms.  Following treadmill stress, there was no echocardiographic evidence of ischemia. \par   [de-identified] : Patient underwent an echocardiogram 5/7/19 and it showed normal LV function without significant valvular pathology.

## 2024-10-24 ENCOUNTER — NON-APPOINTMENT (OUTPATIENT)
Age: 64
End: 2024-10-24

## 2024-10-24 ENCOUNTER — APPOINTMENT (OUTPATIENT)
Dept: CARDIOLOGY | Facility: CLINIC | Age: 64
End: 2024-10-24
Payer: MEDICAID

## 2024-10-24 VITALS
BODY MASS INDEX: 29.82 KG/M2 | SYSTOLIC BLOOD PRESSURE: 124 MMHG | OXYGEN SATURATION: 95 % | WEIGHT: 171 LBS | HEART RATE: 53 BPM | DIASTOLIC BLOOD PRESSURE: 70 MMHG

## 2024-10-24 DIAGNOSIS — I10 ESSENTIAL (PRIMARY) HYPERTENSION: ICD-10-CM

## 2024-10-24 DIAGNOSIS — I47.19 OTHER SUPRAVENTRICULAR TACHYCARDIA: ICD-10-CM

## 2024-10-24 PROCEDURE — 93000 ELECTROCARDIOGRAM COMPLETE: CPT

## 2024-10-24 PROCEDURE — 99214 OFFICE O/P EST MOD 30 MIN: CPT | Mod: 25

## 2024-11-01 ENCOUNTER — NON-APPOINTMENT (OUTPATIENT)
Age: 64
End: 2024-11-01

## 2024-11-01 ENCOUNTER — APPOINTMENT (OUTPATIENT)
Dept: ELECTROPHYSIOLOGY | Facility: CLINIC | Age: 64
End: 2024-11-01
Payer: MEDICAID

## 2024-11-01 VITALS
HEART RATE: 54 BPM | SYSTOLIC BLOOD PRESSURE: 144 MMHG | DIASTOLIC BLOOD PRESSURE: 72 MMHG | BODY MASS INDEX: 29.75 KG/M2 | WEIGHT: 170 LBS | HEIGHT: 63.5 IN | OXYGEN SATURATION: 97 %

## 2024-11-01 DIAGNOSIS — R55 SYNCOPE AND COLLAPSE: ICD-10-CM

## 2024-11-01 DIAGNOSIS — I49.5 SICK SINUS SYNDROME: ICD-10-CM

## 2024-11-01 DIAGNOSIS — R00.1 BRADYCARDIA, UNSPECIFIED: ICD-10-CM

## 2024-11-01 PROCEDURE — 93000 ELECTROCARDIOGRAM COMPLETE: CPT

## 2024-11-01 PROCEDURE — 99204 OFFICE O/P NEW MOD 45 MIN: CPT | Mod: 25

## 2024-11-01 RX ORDER — EMPAGLIFLOZIN 10 MG/1
10 TABLET, FILM COATED ORAL
Refills: 0 | Status: ACTIVE | COMMUNITY

## 2024-11-11 ENCOUNTER — OUTPATIENT (OUTPATIENT)
Dept: OUTPATIENT SERVICES | Facility: HOSPITAL | Age: 64
LOS: 1 days | End: 2024-11-11

## 2024-11-11 VITALS
HEIGHT: 63.5 IN | OXYGEN SATURATION: 96 % | WEIGHT: 166.89 LBS | RESPIRATION RATE: 14 BRPM | TEMPERATURE: 99 F | DIASTOLIC BLOOD PRESSURE: 63 MMHG | SYSTOLIC BLOOD PRESSURE: 127 MMHG | HEART RATE: 52 BPM

## 2024-11-11 DIAGNOSIS — Z98.891 HISTORY OF UTERINE SCAR FROM PREVIOUS SURGERY: Chronic | ICD-10-CM

## 2024-11-11 DIAGNOSIS — R00.1 BRADYCARDIA, UNSPECIFIED: ICD-10-CM

## 2024-11-11 DIAGNOSIS — I10 ESSENTIAL (PRIMARY) HYPERTENSION: ICD-10-CM

## 2024-11-11 DIAGNOSIS — Z98.890 OTHER SPECIFIED POSTPROCEDURAL STATES: Chronic | ICD-10-CM

## 2024-11-11 DIAGNOSIS — E11.9 TYPE 2 DIABETES MELLITUS WITHOUT COMPLICATIONS: ICD-10-CM

## 2024-11-11 LAB
A1C WITH ESTIMATED AVERAGE GLUCOSE RESULT: 6.8 % — HIGH (ref 4–5.6)
ANION GAP SERPL CALC-SCNC: 15 MMOL/L — HIGH (ref 7–14)
BUN SERPL-MCNC: 16 MG/DL — SIGNIFICANT CHANGE UP (ref 7–23)
CALCIUM SERPL-MCNC: 9 MG/DL — SIGNIFICANT CHANGE UP (ref 8.4–10.5)
CHLORIDE SERPL-SCNC: 100 MMOL/L — SIGNIFICANT CHANGE UP (ref 98–107)
CO2 SERPL-SCNC: 24 MMOL/L — SIGNIFICANT CHANGE UP (ref 22–31)
CREAT SERPL-MCNC: 0.68 MG/DL — SIGNIFICANT CHANGE UP (ref 0.5–1.3)
EGFR: 97 ML/MIN/1.73M2 — SIGNIFICANT CHANGE UP
ESTIMATED AVERAGE GLUCOSE: 148 — SIGNIFICANT CHANGE UP
GLUCOSE SERPL-MCNC: 198 MG/DL — HIGH (ref 70–99)
HCT VFR BLD CALC: 44.6 % — SIGNIFICANT CHANGE UP (ref 34.5–45)
HGB BLD-MCNC: 14.1 G/DL — SIGNIFICANT CHANGE UP (ref 11.5–15.5)
MCHC RBC-ENTMCNC: 28.7 PG — SIGNIFICANT CHANGE UP (ref 27–34)
MCHC RBC-ENTMCNC: 31.6 G/DL — LOW (ref 32–36)
MCV RBC AUTO: 90.7 FL — SIGNIFICANT CHANGE UP (ref 80–100)
NRBC # BLD: 0 /100 WBCS — SIGNIFICANT CHANGE UP (ref 0–0)
NRBC # FLD: 0 K/UL — SIGNIFICANT CHANGE UP (ref 0–0)
PLATELET # BLD AUTO: 241 K/UL — SIGNIFICANT CHANGE UP (ref 150–400)
POTASSIUM SERPL-MCNC: 3.4 MMOL/L — LOW (ref 3.5–5.3)
POTASSIUM SERPL-SCNC: 3.4 MMOL/L — LOW (ref 3.5–5.3)
RBC # BLD: 4.92 M/UL — SIGNIFICANT CHANGE UP (ref 3.8–5.2)
RBC # FLD: 13.2 % — SIGNIFICANT CHANGE UP (ref 10.3–14.5)
SODIUM SERPL-SCNC: 139 MMOL/L — SIGNIFICANT CHANGE UP (ref 135–145)
WBC # BLD: 7.62 K/UL — SIGNIFICANT CHANGE UP (ref 3.8–10.5)
WBC # FLD AUTO: 7.62 K/UL — SIGNIFICANT CHANGE UP (ref 3.8–10.5)

## 2024-11-11 NOTE — H&P PST ADULT - HISTORY OF PRESENT ILLNESS
64 year-old woman with PMH PAT, HTN, DM, HLD, presents with symptomatic bradycardia with two episodes of syncope (July and October, denies head injuries).She felt nausea, weakness, sweating and occasional dizziness when changing positions Reports her heart rate is in low 40's at night and 50's during the day, she is scheduled for pacemaker implant  64 year-old woman with PMH PAT, HTN, DM, HLD, presents with symptomatic bradycardia with two episodes of syncope (July and October, denies head injuries).She reports symptoms of nausea, weakness, sweating and occasional dizziness when changing positions, walking or exertional activities; Reports her heart rate is in low 40's at night and 50's during the day, she is scheduled for pacemaker implant

## 2024-11-11 NOTE — H&P PST ADULT - NSICDXFAMILYHX_GEN_ALL_CORE_FT
FAMILY HISTORY:  Father  Still living? No  FH: heart disease, Age at diagnosis: Age Unknown    Sibling  Still living? Unknown  FH: breast cancer, Age at diagnosis: Age Unknown

## 2024-11-11 NOTE — H&P PST ADULT - LAST STRESS TEST
2022 (Can be found on  allscripts) EQUIVOCAL STRESS ECG TEST. Following treadmill stress, there was no echocardiographic evidence of ischemia

## 2024-11-11 NOTE — H&P PST ADULT - PROBLEM SELECTOR PLAN 1
Patient tentatively scheduled for surgery on: 11/18/24  Provided with verbal and written presurgical instructions  Verbalized understanding  with teach back on the following: chlorhexidine wash    Lab specimen drawn at [facility] today: CBC, HgA1c, BMP   EKG in [facility], Echo and Stress test (Can be found on  allscripts)    Patent instructed to hold Vascepa one week prior to surgery

## 2024-11-11 NOTE — H&P PST ADULT - NEGATIVE ENMT SYMPTOMS
no hearing difficulty/no ear pain/no tinnitus/no vertigo/no sinus symptoms/no nasal congestion/no nasal discharge/no dry mouth/no dysphagia

## 2024-11-11 NOTE — H&P PST ADULT - LAST CARDIAC ANGIOGRAM/IMAGING
per EP note CT/MRI: CTA of the coronary arteries 4/3/24 showed mild LAD stenosis with calcium score of 33.

## 2024-11-11 NOTE — H&P PST ADULT - ENMT COMMENTS
FROM of neck; Denies loose teeth, bridges on lower  **REPORTS LEFT LOWER LOOSE TOOTH, patient refuses to extract tooth

## 2024-11-11 NOTE — H&P PST ADULT - LAST ECHOCARDIOGRAM
2022 (Can be found on  allscripts) EF 70-75% moderate left ventricular hypertrophy, normal right ventricular systolic function

## 2024-11-11 NOTE — H&P PST ADULT - NSICDXPASTMEDICALHX_GEN_ALL_CORE_FT
PAST MEDICAL HISTORY:  Anxiety     Bradycardia     Breast mass     Diabetes     GERD (gastroesophageal reflux disease)     HLD (hyperlipidemia)     HTN (hypertension)     S/P breast biopsy     Syncope

## 2024-11-11 NOTE — H&P PST ADULT - SKIN/BREAST COMMENTS
Abnormal left breast imaging on mammogram, patient reports non palpable mass will follow after PPM implant

## 2024-11-15 NOTE — ASU PATIENT PROFILE, ADULT - NS PREOP UNDERSTANDS INFO
Patient advised regarding escort to drive patient home upon discharge. Verbalized understanding./yes

## 2024-11-18 ENCOUNTER — OUTPATIENT (OUTPATIENT)
Dept: OUTPATIENT SERVICES | Facility: HOSPITAL | Age: 64
LOS: 1 days | Discharge: HOME | End: 2024-11-18

## 2024-11-18 ENCOUNTER — NON-APPOINTMENT (OUTPATIENT)
Age: 64
End: 2024-11-18

## 2024-11-18 ENCOUNTER — TRANSCRIPTION ENCOUNTER (OUTPATIENT)
Age: 64
End: 2024-11-18

## 2024-11-18 VITALS
HEART RATE: 61 BPM | WEIGHT: 169.98 LBS | OXYGEN SATURATION: 96 % | SYSTOLIC BLOOD PRESSURE: 126 MMHG | RESPIRATION RATE: 15 BRPM | HEIGHT: 63.78 IN | TEMPERATURE: 98 F | DIASTOLIC BLOOD PRESSURE: 67 MMHG

## 2024-11-18 VITALS
SYSTOLIC BLOOD PRESSURE: 144 MMHG | HEART RATE: 61 BPM | RESPIRATION RATE: 21 BRPM | DIASTOLIC BLOOD PRESSURE: 72 MMHG | OXYGEN SATURATION: 95 %

## 2024-11-18 DIAGNOSIS — Z98.890 OTHER SPECIFIED POSTPROCEDURAL STATES: Chronic | ICD-10-CM

## 2024-11-18 DIAGNOSIS — Z98.891 HISTORY OF UTERINE SCAR FROM PREVIOUS SURGERY: Chronic | ICD-10-CM

## 2024-11-18 DIAGNOSIS — R00.1 BRADYCARDIA, UNSPECIFIED: ICD-10-CM

## 2024-11-18 LAB
BASE EXCESS BLDV CALC-SCNC: 5.2 MMOL/L — HIGH (ref -2–3)
CA-I SERPL-SCNC: 1.17 MMOL/L — SIGNIFICANT CHANGE UP (ref 1.15–1.33)
CO2 BLDV-SCNC: 32 MMOL/L — HIGH (ref 22–26)
GAS PNL BLDV: 137 MMOL/L — SIGNIFICANT CHANGE UP (ref 136–145)
GLUCOSE BLDC GLUCOMTR-MCNC: 139 MG/DL — HIGH (ref 70–99)
GLUCOSE BLDC GLUCOMTR-MCNC: 170 MG/DL — HIGH (ref 70–99)
GLUCOSE BLDV-MCNC: 134 MG/DL — HIGH (ref 70–99)
HCO3 BLDV-SCNC: 30 MMOL/L — HIGH (ref 22–29)
HCT VFR BLDA CALC: 45 % — SIGNIFICANT CHANGE UP
HGB BLD CALC-MCNC: 15.1 G/DL — SIGNIFICANT CHANGE UP (ref 11.7–16.1)
LACTATE BLDV-MCNC: 1.4 MMOL/L — SIGNIFICANT CHANGE UP (ref 0.5–2)
PCO2 BLDV: 46 MMHG — HIGH (ref 39–42)
PH BLDV: 7.43 — SIGNIFICANT CHANGE UP (ref 7.32–7.43)
PO2 BLDV: 34 MMHG — SIGNIFICANT CHANGE UP (ref 25–45)
POTASSIUM BLDV-SCNC: 4.3 MMOL/L — SIGNIFICANT CHANGE UP (ref 3.5–5.1)
SAO2 % BLDV: 68 % — SIGNIFICANT CHANGE UP (ref 67–88)

## 2024-11-18 PROCEDURE — 93010 ELECTROCARDIOGRAM REPORT: CPT | Mod: 76

## 2024-11-18 RX ORDER — EMPAGLIFLOZIN 25 MG/1
1 TABLET, FILM COATED ORAL
Refills: 0 | DISCHARGE

## 2024-11-18 RX ORDER — SIMVASTATIN 80 MG/1
1 TABLET, FILM COATED ORAL
Refills: 0 | DISCHARGE

## 2024-11-18 RX ORDER — ICOSAPENT ETHYL 1 G/1
2 CAPSULE, LIQUID FILLED ORAL
Refills: 0 | DISCHARGE

## 2024-11-18 RX ORDER — HYDROMORPHONE HYDROCHLORIDE 2 MG/1
0.5 TABLET ORAL
Refills: 0 | Status: DISCONTINUED | OUTPATIENT
Start: 2024-11-18 | End: 2024-11-18

## 2024-11-18 RX ORDER — AMLODIPINE BESYLATE 10 MG
1 TABLET ORAL
Refills: 0 | DISCHARGE

## 2024-11-18 RX ORDER — TRAZODONE HYDROCHLORIDE 100 MG/1
0.5 TABLET ORAL
Refills: 0 | DISCHARGE

## 2024-11-18 RX ORDER — DOXYCYCLINE HYCLATE 150 MG/1
1 TABLET, COATED ORAL
Qty: 4 | Refills: 0
Start: 2024-11-18 | End: 2024-11-19

## 2024-11-18 RX ORDER — SODIUM CHLORIDE 9 MG/ML
3 INJECTION, SOLUTION INTRAMUSCULAR; INTRAVENOUS; SUBCUTANEOUS EVERY 8 HOURS
Refills: 0 | Status: DISCONTINUED | OUTPATIENT
Start: 2024-11-18 | End: 2024-11-18

## 2024-11-18 RX ORDER — ONDANSETRON HYDROCHLORIDE 4 MG/1
4 TABLET, FILM COATED ORAL ONCE
Refills: 0 | Status: DISCONTINUED | OUTPATIENT
Start: 2024-11-18 | End: 2024-11-18

## 2024-11-18 RX ORDER — ESCITALOPRAM 10 MG/1
1 TABLET, FILM COATED ORAL
Refills: 0 | DISCHARGE

## 2024-11-18 NOTE — ASU DISCHARGE PLAN (ADULT/PEDIATRIC) - CARE PROVIDER_API CALL
Dany Benito  Cardiovascular Disease  [address] [address] [place] [address], [address]  [place] [address]  Phone: [phone]  Fax: [phone]  Follow Up Time:

## 2024-11-18 NOTE — ASU DISCHARGE PLAN (ADULT/PEDIATRIC) - ASU DC SPECIAL INSTRUCTIONSFT
Patient requested her friend translate.   Written instructions and contact information provided.   Instructed to remove dressing in 24 hours.   Patient given a home monitor with verbal and written instructions.   She has a follow-up appointment in the device clinic on 12/6/24 at 1:00pm [address] [place] [address] Oncology [facility] [phone].

## 2024-11-18 NOTE — ASU DISCHARGE PLAN (ADULT/PEDIATRIC) - NS MD DC FALL RISK RISK
For information on Fall & Injury Prevention, visit: [email or web address] OR  [email or web address] OR  https://www.cdc.gov/steadi/patient.html

## 2024-11-18 NOTE — ASU DISCHARGE PLAN (ADULT/PEDIATRIC) - FINANCIAL ASSISTANCE
[facility] provides services at a reduced cost to those who are determined to be eligible through [facility]’s financial assistance program. Information regarding [facility]’s financial assistance program can be found by going to [email or web address] or by calling [phone].

## 2024-11-18 NOTE — PRE PROCEDURE NOTE - PRE PROCEDURE EVALUATION
The patient presents today for elective PPM implant.   See hard copy of H&P from [employer] and [facility].  The patient denies chest pain, SOB, palpitations, dizziness, presyncope, syncope,  headache, visual disturbances, CVA, PE, DVT, MACK, abdominal pain, N/V/D/C, hematochezia, melena, dysuria, hematuria, fever, chills.  Medications reviewed.  Last dose of Jardiance was  taken on 11/14/24  The patient denies any new complaints since the last time he was seen by Dr. Benito and in [facility].

## 2024-11-19 ENCOUNTER — NON-APPOINTMENT (OUTPATIENT)
Age: 64
End: 2024-11-19

## 2024-11-20 PROBLEM — N63.0 UNSPECIFIED LUMP IN UNSPECIFIED BREAST: Chronic | Status: ACTIVE | Noted: 2024-11-11

## 2024-11-20 PROBLEM — R55 SYNCOPE AND COLLAPSE: Chronic | Status: ACTIVE | Noted: 2024-11-11

## 2024-11-20 PROBLEM — I10 ESSENTIAL (PRIMARY) HYPERTENSION: Chronic | Status: ACTIVE | Noted: 2024-11-11

## 2024-11-20 PROBLEM — E11.9 TYPE 2 DIABETES MELLITUS WITHOUT COMPLICATIONS: Chronic | Status: ACTIVE | Noted: 2024-11-11

## 2024-11-20 PROBLEM — R00.1 BRADYCARDIA, UNSPECIFIED: Chronic | Status: ACTIVE | Noted: 2024-11-11

## 2024-11-20 PROBLEM — K21.9 GASTRO-ESOPHAGEAL REFLUX DISEASE WITHOUT ESOPHAGITIS: Chronic | Status: ACTIVE | Noted: 2024-11-11

## 2024-11-20 PROBLEM — Z98.890 OTHER SPECIFIED POSTPROCEDURAL STATES: Chronic | Status: ACTIVE | Noted: 2024-11-11

## 2024-11-20 PROBLEM — E78.5 HYPERLIPIDEMIA, UNSPECIFIED: Chronic | Status: ACTIVE | Noted: 2024-11-11

## 2024-12-06 ENCOUNTER — APPOINTMENT (OUTPATIENT)
Dept: ELECTROPHYSIOLOGY | Facility: CLINIC | Age: 64
End: 2024-12-06
Payer: MEDICAID

## 2024-12-06 ENCOUNTER — NON-APPOINTMENT (OUTPATIENT)
Age: 64
End: 2024-12-06

## 2024-12-06 PROCEDURE — 93280 PM DEVICE PROGR EVAL DUAL: CPT

## 2024-12-10 ENCOUNTER — APPOINTMENT (OUTPATIENT)
Dept: ELECTROPHYSIOLOGY | Facility: CLINIC | Age: 64
End: 2024-12-10
Payer: MEDICAID

## 2024-12-10 ENCOUNTER — NON-APPOINTMENT (OUTPATIENT)
Age: 64
End: 2024-12-10

## 2024-12-10 PROCEDURE — 93280 PM DEVICE PROGR EVAL DUAL: CPT

## 2024-12-20 ENCOUNTER — APPOINTMENT (OUTPATIENT)
Dept: ELECTROPHYSIOLOGY | Facility: CLINIC | Age: 64
End: 2024-12-20
Payer: MEDICAID

## 2024-12-20 ENCOUNTER — NON-APPOINTMENT (OUTPATIENT)
Age: 64
End: 2024-12-20

## 2024-12-20 VITALS
SYSTOLIC BLOOD PRESSURE: 113 MMHG | OXYGEN SATURATION: 97 % | WEIGHT: 168 LBS | DIASTOLIC BLOOD PRESSURE: 71 MMHG | HEIGHT: 63.5 IN | TEMPERATURE: 98.8 F | BODY MASS INDEX: 29.4 KG/M2 | HEART RATE: 61 BPM

## 2024-12-20 DIAGNOSIS — E78.5 HYPERLIPIDEMIA, UNSPECIFIED: ICD-10-CM

## 2024-12-20 DIAGNOSIS — R00.1 BRADYCARDIA, UNSPECIFIED: ICD-10-CM

## 2024-12-20 DIAGNOSIS — Z95.0 PRESENCE OF CARDIAC PACEMAKER: ICD-10-CM

## 2024-12-20 DIAGNOSIS — R00.2 PALPITATIONS: ICD-10-CM

## 2024-12-20 DIAGNOSIS — I10 ESSENTIAL (PRIMARY) HYPERTENSION: ICD-10-CM

## 2024-12-20 PROCEDURE — 99213 OFFICE O/P EST LOW 20 MIN: CPT | Mod: 25

## 2024-12-20 PROCEDURE — 93000 ELECTROCARDIOGRAM COMPLETE: CPT

## 2025-01-02 ENCOUNTER — APPOINTMENT (OUTPATIENT)
Dept: CARDIOLOGY | Facility: CLINIC | Age: 65
End: 2025-01-02
Payer: MEDICARE

## 2025-01-02 VITALS
BODY MASS INDEX: 29.29 KG/M2 | OXYGEN SATURATION: 96 % | RESPIRATION RATE: 18 BRPM | DIASTOLIC BLOOD PRESSURE: 72 MMHG | WEIGHT: 168 LBS | HEART RATE: 59 BPM | SYSTOLIC BLOOD PRESSURE: 125 MMHG

## 2025-01-02 DIAGNOSIS — I10 ESSENTIAL (PRIMARY) HYPERTENSION: ICD-10-CM

## 2025-01-02 DIAGNOSIS — Z01.810 ENCOUNTER FOR PREPROCEDURAL CARDIOVASCULAR EXAMINATION: ICD-10-CM

## 2025-01-02 PROCEDURE — 93306 TTE W/DOPPLER COMPLETE: CPT

## 2025-01-02 PROCEDURE — 99204 OFFICE O/P NEW MOD 45 MIN: CPT

## 2025-01-03 PROBLEM — Z01.810 PRE-OPERATIVE CARDIOVASCULAR EXAMINATION: Status: ACTIVE | Noted: 2025-01-03

## 2025-01-07 ENCOUNTER — APPOINTMENT (OUTPATIENT)
Dept: CARDIOLOGY | Facility: CLINIC | Age: 65
End: 2025-01-07
Payer: MEDICARE

## 2025-01-07 ENCOUNTER — APPOINTMENT (OUTPATIENT)
Dept: CARDIOLOGY | Facility: CLINIC | Age: 65
End: 2025-01-07

## 2025-01-07 VITALS
SYSTOLIC BLOOD PRESSURE: 156 MMHG | OXYGEN SATURATION: 96 % | RESPIRATION RATE: 18 BRPM | DIASTOLIC BLOOD PRESSURE: 75 MMHG | HEART RATE: 57 BPM

## 2025-01-07 DIAGNOSIS — R07.89 OTHER CHEST PAIN: ICD-10-CM

## 2025-01-07 PROCEDURE — 93015 CV STRESS TEST SUPVJ I&R: CPT

## 2025-01-07 PROCEDURE — 99214 OFFICE O/P EST MOD 30 MIN: CPT | Mod: 25

## 2025-01-24 ENCOUNTER — APPOINTMENT (OUTPATIENT)
Dept: ELECTROPHYSIOLOGY | Facility: CLINIC | Age: 65
End: 2025-01-24

## 2025-03-18 ENCOUNTER — APPOINTMENT (OUTPATIENT)
Dept: CARDIOLOGY | Facility: CLINIC | Age: 65
End: 2025-03-18

## 2025-03-18 ENCOUNTER — NON-APPOINTMENT (OUTPATIENT)
Age: 65
End: 2025-03-18

## 2025-03-18 VITALS
OXYGEN SATURATION: 97 % | DIASTOLIC BLOOD PRESSURE: 76 MMHG | SYSTOLIC BLOOD PRESSURE: 123 MMHG | BODY MASS INDEX: 29.64 KG/M2 | WEIGHT: 170 LBS | RESPIRATION RATE: 18 BRPM | HEART RATE: 55 BPM

## 2025-03-18 PROCEDURE — 99213 OFFICE O/P EST LOW 20 MIN: CPT

## 2025-04-17 ENCOUNTER — APPOINTMENT (OUTPATIENT)
Dept: CARDIOLOGY | Facility: CLINIC | Age: 65
End: 2025-04-17
Payer: MEDICARE

## 2025-04-17 ENCOUNTER — NON-APPOINTMENT (OUTPATIENT)
Age: 65
End: 2025-04-17

## 2025-04-17 VITALS
OXYGEN SATURATION: 97 % | RESPIRATION RATE: 18 BRPM | SYSTOLIC BLOOD PRESSURE: 129 MMHG | DIASTOLIC BLOOD PRESSURE: 77 MMHG | BODY MASS INDEX: 29.29 KG/M2 | HEART RATE: 63 BPM | WEIGHT: 168 LBS

## 2025-04-17 DIAGNOSIS — R00.2 PALPITATIONS: ICD-10-CM

## 2025-04-17 DIAGNOSIS — R93.1 ABNORMAL FINDINGS ON DIAGNOSTIC IMAGING OF HEART AND CORONARY CIRCULATION: ICD-10-CM

## 2025-04-17 PROCEDURE — 93000 ELECTROCARDIOGRAM COMPLETE: CPT

## 2025-04-17 PROCEDURE — 99214 OFFICE O/P EST MOD 30 MIN: CPT | Mod: 25

## 2025-04-21 ENCOUNTER — NON-APPOINTMENT (OUTPATIENT)
Age: 65
End: 2025-04-21

## 2025-04-21 ENCOUNTER — APPOINTMENT (OUTPATIENT)
Dept: ELECTROPHYSIOLOGY | Facility: CLINIC | Age: 65
End: 2025-04-21
Payer: MEDICARE

## 2025-04-21 ENCOUNTER — APPOINTMENT (OUTPATIENT)
Dept: CARDIOLOGY | Facility: CLINIC | Age: 65
End: 2025-04-21
Payer: MEDICARE

## 2025-04-21 VITALS
RESPIRATION RATE: 18 BRPM | OXYGEN SATURATION: 97 % | DIASTOLIC BLOOD PRESSURE: 82 MMHG | SYSTOLIC BLOOD PRESSURE: 146 MMHG | HEART RATE: 55 BPM

## 2025-04-21 DIAGNOSIS — Z95.0 PRESENCE OF CARDIAC PACEMAKER: ICD-10-CM

## 2025-04-21 DIAGNOSIS — R55 SYNCOPE AND COLLAPSE: ICD-10-CM

## 2025-04-21 DIAGNOSIS — I10 ESSENTIAL (PRIMARY) HYPERTENSION: ICD-10-CM

## 2025-04-21 DIAGNOSIS — Z01.810 ENCOUNTER FOR PREPROCEDURAL CARDIOVASCULAR EXAMINATION: ICD-10-CM

## 2025-04-21 DIAGNOSIS — I25.10 ATHEROSCLEROTIC HEART DISEASE OF NATIVE CORONARY ARTERY W/OUT ANGINA PECTORIS: ICD-10-CM

## 2025-04-21 PROCEDURE — 99214 OFFICE O/P EST MOD 30 MIN: CPT | Mod: 25

## 2025-04-21 PROCEDURE — 93000 ELECTROCARDIOGRAM COMPLETE: CPT | Mod: NC

## 2025-04-21 PROCEDURE — 93296 REM INTERROG EVL PM/IDS: CPT

## 2025-04-21 PROCEDURE — 93294 REM INTERROG EVL PM/LDLS PM: CPT

## 2025-07-21 ENCOUNTER — APPOINTMENT (OUTPATIENT)
Dept: ELECTROPHYSIOLOGY | Facility: CLINIC | Age: 65
End: 2025-07-21
Payer: MEDICARE

## 2025-07-21 ENCOUNTER — NON-APPOINTMENT (OUTPATIENT)
Age: 65
End: 2025-07-21

## 2025-07-21 PROCEDURE — 93296 REM INTERROG EVL PM/IDS: CPT

## 2025-07-21 PROCEDURE — 93294 REM INTERROG EVL PM/LDLS PM: CPT

## 2025-07-22 ENCOUNTER — APPOINTMENT (OUTPATIENT)
Dept: CARDIOLOGY | Facility: CLINIC | Age: 65
End: 2025-07-22
Payer: MEDICARE

## 2025-07-22 VITALS
DIASTOLIC BLOOD PRESSURE: 73 MMHG | HEART RATE: 76 BPM | SYSTOLIC BLOOD PRESSURE: 118 MMHG | BODY MASS INDEX: 29.64 KG/M2 | OXYGEN SATURATION: 95 % | RESPIRATION RATE: 14 BRPM | WEIGHT: 170 LBS

## 2025-07-22 DIAGNOSIS — Z95.0 PRESENCE OF CARDIAC PACEMAKER: ICD-10-CM

## 2025-07-22 DIAGNOSIS — I10 ESSENTIAL (PRIMARY) HYPERTENSION: ICD-10-CM

## 2025-07-22 PROCEDURE — 99214 OFFICE O/P EST MOD 30 MIN: CPT

## 2025-09-15 ENCOUNTER — APPOINTMENT (OUTPATIENT)
Dept: CARDIOLOGY | Facility: CLINIC | Age: 65
End: 2025-09-15
Payer: MEDICARE

## 2025-09-15 VITALS
OXYGEN SATURATION: 94 % | DIASTOLIC BLOOD PRESSURE: 76 MMHG | SYSTOLIC BLOOD PRESSURE: 144 MMHG | HEART RATE: 57 BPM | WEIGHT: 170 LBS | BODY MASS INDEX: 29.64 KG/M2

## 2025-09-15 DIAGNOSIS — I10 ESSENTIAL (PRIMARY) HYPERTENSION: ICD-10-CM

## 2025-09-15 DIAGNOSIS — I47.19 OTHER SUPRAVENTRICULAR TACHYCARDIA: ICD-10-CM

## 2025-09-15 DIAGNOSIS — Z95.0 PRESENCE OF CARDIAC PACEMAKER: ICD-10-CM

## 2025-09-15 PROCEDURE — 99214 OFFICE O/P EST MOD 30 MIN: CPT
